# Patient Record
Sex: MALE | Race: WHITE | Employment: OTHER | URBAN - METROPOLITAN AREA
[De-identification: names, ages, dates, MRNs, and addresses within clinical notes are randomized per-mention and may not be internally consistent; named-entity substitution may affect disease eponyms.]

---

## 2017-02-23 PROBLEM — Z87.11 HX OF PEPTIC ULCER: Status: ACTIVE | Noted: 2017-02-23

## 2017-02-23 PROBLEM — K27.9 PEPTIC ULCER DISEASE: Status: RESOLVED | Noted: 2017-02-23 | Resolved: 2017-02-23

## 2017-02-23 PROBLEM — I48.0 PAROXYSMAL ATRIAL FIBRILLATION (HCC): Status: ACTIVE | Noted: 2017-02-23

## 2017-02-23 PROBLEM — N31.9 NEUROGENIC BLADDER: Status: ACTIVE | Noted: 2017-02-23

## 2017-02-23 PROBLEM — Z87.440: Status: ACTIVE | Noted: 2017-02-23

## 2017-02-23 PROBLEM — K27.9 PEPTIC ULCER DISEASE: Status: ACTIVE | Noted: 2017-02-23

## 2017-08-30 PROBLEM — M48.00 SPINAL STENOSIS: Status: ACTIVE | Noted: 2017-08-30

## 2017-10-25 ENCOUNTER — HOSPITAL ENCOUNTER (OUTPATIENT)
Dept: GENERAL RADIOLOGY | Age: 82
Discharge: HOME | End: 2017-10-25
Payer: MEDICARE

## 2017-10-25 DIAGNOSIS — R05.9 COUGH: ICD-10-CM

## 2017-10-25 PROCEDURE — 71020 XR CHEST PA LAT: CPT

## 2017-11-06 PROBLEM — I48.91 ATRIAL FIBRILLATION (HCC): Status: ACTIVE | Noted: 2017-02-23

## 2017-11-06 PROBLEM — I50.30 HEART FAILURE WITH PRESERVED EJECTION FRACTION (HCC): Status: ACTIVE | Noted: 2017-11-06

## 2020-01-24 PROBLEM — R00.1 BRADYCARDIA: Status: ACTIVE | Noted: 2020-01-24

## 2020-01-24 PROBLEM — I48.21 PERMANENT ATRIAL FIBRILLATION (HCC): Status: ACTIVE | Noted: 2017-02-23

## 2020-02-20 ENCOUNTER — HOSPITAL ENCOUNTER (INPATIENT)
Age: 85
LOS: 2 days | Discharge: HOME HEALTH | DRG: 698 | End: 2020-02-22
Attending: EMERGENCY MEDICINE | Admitting: FAMILY MEDICINE
Payer: MEDICARE

## 2020-02-20 ENCOUNTER — APPOINTMENT (OUTPATIENT)
Dept: GENERAL RADIOLOGY | Age: 85
DRG: 698 | End: 2020-02-20
Attending: EMERGENCY MEDICINE
Payer: MEDICARE

## 2020-02-20 DIAGNOSIS — T83.511A URINARY TRACT INFECTION ASSOCIATED WITH CATHETERIZATION OF URINARY TRACT, UNSPECIFIED INDWELLING URINARY CATHETER TYPE, INITIAL ENCOUNTER (HCC): Primary | ICD-10-CM

## 2020-02-20 DIAGNOSIS — I48.11 LONGSTANDING PERSISTENT ATRIAL FIBRILLATION (HCC): ICD-10-CM

## 2020-02-20 DIAGNOSIS — I10 ESSENTIAL HYPERTENSION: ICD-10-CM

## 2020-02-20 DIAGNOSIS — N39.0 URINARY TRACT INFECTION ASSOCIATED WITH CATHETERIZATION OF URINARY TRACT, UNSPECIFIED INDWELLING URINARY CATHETER TYPE, INITIAL ENCOUNTER (HCC): Primary | ICD-10-CM

## 2020-02-20 DIAGNOSIS — I50.33 DIASTOLIC CHF, ACUTE ON CHRONIC (HCC): ICD-10-CM

## 2020-02-20 PROBLEM — Z87.11 HX OF PEPTIC ULCER: Status: RESOLVED | Noted: 2017-02-23 | Resolved: 2020-02-20

## 2020-02-20 PROBLEM — I50.30 HEART FAILURE WITH PRESERVED EJECTION FRACTION (HCC): Chronic | Status: ACTIVE | Noted: 2017-11-06

## 2020-02-20 PROBLEM — A41.9 SEPSIS SECONDARY TO UTI (HCC): Status: ACTIVE | Noted: 2020-02-20

## 2020-02-20 PROBLEM — M48.00 SPINAL STENOSIS: Status: RESOLVED | Noted: 2017-08-30 | Resolved: 2020-02-20

## 2020-02-20 PROBLEM — Z87.440: Status: RESOLVED | Noted: 2017-02-23 | Resolved: 2020-02-20

## 2020-02-20 PROBLEM — N31.9 NEUROGENIC BLADDER: Chronic | Status: ACTIVE | Noted: 2017-02-23

## 2020-02-20 PROBLEM — I48.21 PERMANENT ATRIAL FIBRILLATION (HCC): Status: RESOLVED | Noted: 2017-02-23 | Resolved: 2020-02-20

## 2020-02-20 PROBLEM — R00.1 BRADYCARDIA: Status: RESOLVED | Noted: 2020-01-24 | Resolved: 2020-02-20

## 2020-02-20 LAB
ALBUMIN SERPL-MCNC: 3.3 G/DL (ref 3.2–4.6)
ALBUMIN/GLOB SERPL: 1 {RATIO} (ref 1.2–3.5)
ALP SERPL-CCNC: 80 U/L (ref 50–136)
ALT SERPL-CCNC: 11 U/L (ref 12–65)
ANION GAP SERPL CALC-SCNC: 5 MMOL/L (ref 7–16)
AST SERPL-CCNC: 13 U/L (ref 15–37)
BACTERIA URNS QL MICRO: ABNORMAL /HPF
BASOPHILS # BLD: 0 K/UL (ref 0–0.2)
BASOPHILS NFR BLD: 0 % (ref 0–2)
BILIRUB SERPL-MCNC: 0.6 MG/DL (ref 0.2–1.1)
BUN SERPL-MCNC: 19 MG/DL (ref 8–23)
CALCIUM SERPL-MCNC: 8.5 MG/DL (ref 8.3–10.4)
CASTS URNS QL MICRO: ABNORMAL /LPF
CHLORIDE SERPL-SCNC: 107 MMOL/L (ref 98–107)
CO2 SERPL-SCNC: 28 MMOL/L (ref 21–32)
CREAT SERPL-MCNC: 2 MG/DL (ref 0.8–1.5)
DIFFERENTIAL METHOD BLD: ABNORMAL
EOSINOPHIL # BLD: 0.1 K/UL (ref 0–0.8)
EOSINOPHIL NFR BLD: 1 % (ref 0.5–7.8)
EPI CELLS #/AREA URNS HPF: 0 /HPF
ERYTHROCYTE [DISTWIDTH] IN BLOOD BY AUTOMATED COUNT: 15.9 % (ref 11.9–14.6)
FLUAV AG NPH QL IA: NEGATIVE
FLUBV AG NPH QL IA: NEGATIVE
GLOBULIN SER CALC-MCNC: 3.2 G/DL (ref 2.3–3.5)
GLUCOSE SERPL-MCNC: 111 MG/DL (ref 65–100)
HCT VFR BLD AUTO: 31.7 % (ref 41.1–50.3)
HGB BLD-MCNC: 10.6 G/DL (ref 13.6–17.2)
IMM GRANULOCYTES # BLD AUTO: 0.1 K/UL (ref 0–0.5)
IMM GRANULOCYTES NFR BLD AUTO: 1 % (ref 0–5)
LACTATE SERPL-SCNC: 0.9 MMOL/L (ref 0.4–2)
LYMPHOCYTES # BLD: 0.5 K/UL (ref 0.5–4.6)
LYMPHOCYTES NFR BLD: 6 % (ref 13–44)
MCH RBC QN AUTO: 31.2 PG (ref 26.1–32.9)
MCHC RBC AUTO-ENTMCNC: 33.4 G/DL (ref 31.4–35)
MCV RBC AUTO: 93.2 FL (ref 79.6–97.8)
MONOCYTES # BLD: 0.6 K/UL (ref 0.1–1.3)
MONOCYTES NFR BLD: 7 % (ref 4–12)
NEUTS SEG # BLD: 7.5 K/UL (ref 1.7–8.2)
NEUTS SEG NFR BLD: 85 % (ref 43–78)
NRBC # BLD: 0 K/UL (ref 0–0.2)
PLATELET # BLD AUTO: 147 K/UL (ref 150–450)
PMV BLD AUTO: 11.8 FL (ref 9.4–12.3)
POTASSIUM SERPL-SCNC: 3.6 MMOL/L (ref 3.5–5.1)
PROCALCITONIN SERPL-MCNC: 0.05 NG/ML
PROT SERPL-MCNC: 6.5 G/DL (ref 6.3–8.2)
RBC # BLD AUTO: 3.4 M/UL (ref 4.23–5.6)
RBC #/AREA URNS HPF: ABNORMAL /HPF
SODIUM SERPL-SCNC: 140 MMOL/L (ref 136–145)
SPECIMEN SOURCE: NORMAL
WBC # BLD AUTO: 8.9 K/UL (ref 4.3–11.1)
WBC URNS QL MICRO: >100 /HPF

## 2020-02-20 PROCEDURE — 74011250637 HC RX REV CODE- 250/637: Performed by: EMERGENCY MEDICINE

## 2020-02-20 PROCEDURE — 80053 COMPREHEN METABOLIC PANEL: CPT

## 2020-02-20 PROCEDURE — 84145 PROCALCITONIN (PCT): CPT

## 2020-02-20 PROCEDURE — 74011000258 HC RX REV CODE- 258: Performed by: FAMILY MEDICINE

## 2020-02-20 PROCEDURE — 99285 EMERGENCY DEPT VISIT HI MDM: CPT

## 2020-02-20 PROCEDURE — 74011250637 HC RX REV CODE- 250/637: Performed by: FAMILY MEDICINE

## 2020-02-20 PROCEDURE — 87186 SC STD MICRODIL/AGAR DIL: CPT

## 2020-02-20 PROCEDURE — 71046 X-RAY EXAM CHEST 2 VIEWS: CPT

## 2020-02-20 PROCEDURE — 87088 URINE BACTERIA CULTURE: CPT

## 2020-02-20 PROCEDURE — 81003 URINALYSIS AUTO W/O SCOPE: CPT

## 2020-02-20 PROCEDURE — 74011250636 HC RX REV CODE- 250/636: Performed by: EMERGENCY MEDICINE

## 2020-02-20 PROCEDURE — 74011250636 HC RX REV CODE- 250/636: Performed by: FAMILY MEDICINE

## 2020-02-20 PROCEDURE — 87804 INFLUENZA ASSAY W/OPTIC: CPT

## 2020-02-20 PROCEDURE — 87040 BLOOD CULTURE FOR BACTERIA: CPT

## 2020-02-20 PROCEDURE — 93005 ELECTROCARDIOGRAM TRACING: CPT | Performed by: EMERGENCY MEDICINE

## 2020-02-20 PROCEDURE — 87086 URINE CULTURE/COLONY COUNT: CPT

## 2020-02-20 PROCEDURE — 85025 COMPLETE CBC W/AUTO DIFF WBC: CPT

## 2020-02-20 PROCEDURE — 65270000029 HC RM PRIVATE

## 2020-02-20 PROCEDURE — 83605 ASSAY OF LACTIC ACID: CPT

## 2020-02-20 PROCEDURE — 81015 MICROSCOPIC EXAM OF URINE: CPT

## 2020-02-20 RX ORDER — LEVOTHYROXINE SODIUM 100 UG/1
100 TABLET ORAL
Status: DISCONTINUED | OUTPATIENT
Start: 2020-02-21 | End: 2020-02-22 | Stop reason: HOSPADM

## 2020-02-20 RX ORDER — BISACODYL 5 MG
5 TABLET, DELAYED RELEASE (ENTERIC COATED) ORAL DAILY PRN
Status: DISCONTINUED | OUTPATIENT
Start: 2020-02-20 | End: 2020-02-22 | Stop reason: HOSPADM

## 2020-02-20 RX ORDER — SODIUM CHLORIDE 9 MG/ML
125 INJECTION, SOLUTION INTRAVENOUS CONTINUOUS
Status: DISCONTINUED | OUTPATIENT
Start: 2020-02-20 | End: 2020-02-22 | Stop reason: HOSPADM

## 2020-02-20 RX ORDER — PRAVASTATIN SODIUM 20 MG/1
40 TABLET ORAL
Status: DISCONTINUED | OUTPATIENT
Start: 2020-02-20 | End: 2020-02-22 | Stop reason: HOSPADM

## 2020-02-20 RX ORDER — NALOXONE HYDROCHLORIDE 0.4 MG/ML
0.4 INJECTION, SOLUTION INTRAMUSCULAR; INTRAVENOUS; SUBCUTANEOUS AS NEEDED
Status: DISCONTINUED | OUTPATIENT
Start: 2020-02-20 | End: 2020-02-22 | Stop reason: HOSPADM

## 2020-02-20 RX ORDER — OXYCODONE HYDROCHLORIDE 5 MG/1
5 TABLET ORAL
Status: DISCONTINUED | OUTPATIENT
Start: 2020-02-20 | End: 2020-02-22 | Stop reason: HOSPADM

## 2020-02-20 RX ORDER — GABAPENTIN 300 MG/1
300 CAPSULE ORAL 3 TIMES DAILY
Status: DISCONTINUED | OUTPATIENT
Start: 2020-02-20 | End: 2020-02-22 | Stop reason: HOSPADM

## 2020-02-20 RX ORDER — METOPROLOL SUCCINATE 50 MG/1
50 TABLET, EXTENDED RELEASE ORAL DAILY
Status: DISCONTINUED | OUTPATIENT
Start: 2020-02-21 | End: 2020-02-22 | Stop reason: HOSPADM

## 2020-02-20 RX ORDER — TRAZODONE HYDROCHLORIDE 50 MG/1
200 TABLET ORAL
Status: DISCONTINUED | OUTPATIENT
Start: 2020-02-20 | End: 2020-02-22 | Stop reason: HOSPADM

## 2020-02-20 RX ORDER — FUROSEMIDE 40 MG/1
40 TABLET ORAL
Status: DISCONTINUED | OUTPATIENT
Start: 2020-02-20 | End: 2020-02-22 | Stop reason: HOSPADM

## 2020-02-20 RX ORDER — LORAZEPAM 0.5 MG/1
0.5 TABLET ORAL
Status: DISCONTINUED | OUTPATIENT
Start: 2020-02-20 | End: 2020-02-22 | Stop reason: HOSPADM

## 2020-02-20 RX ORDER — SODIUM CHLORIDE 0.9 % (FLUSH) 0.9 %
5-40 SYRINGE (ML) INJECTION AS NEEDED
Status: DISCONTINUED | OUTPATIENT
Start: 2020-02-20 | End: 2020-02-22 | Stop reason: HOSPADM

## 2020-02-20 RX ORDER — AMIODARONE HYDROCHLORIDE 200 MG/1
200 TABLET ORAL DAILY
Status: DISCONTINUED | OUTPATIENT
Start: 2020-02-21 | End: 2020-02-22 | Stop reason: HOSPADM

## 2020-02-20 RX ORDER — ACETAMINOPHEN 325 MG/1
650 TABLET ORAL
Status: DISCONTINUED | OUTPATIENT
Start: 2020-02-20 | End: 2020-02-22 | Stop reason: HOSPADM

## 2020-02-20 RX ORDER — SODIUM CHLORIDE 0.9 % (FLUSH) 0.9 %
5-40 SYRINGE (ML) INJECTION EVERY 8 HOURS
Status: DISCONTINUED | OUTPATIENT
Start: 2020-02-20 | End: 2020-02-22 | Stop reason: HOSPADM

## 2020-02-20 RX ORDER — ACETAMINOPHEN 500 MG
1000 TABLET ORAL
Status: COMPLETED | OUTPATIENT
Start: 2020-02-20 | End: 2020-02-20

## 2020-02-20 RX ORDER — PANTOPRAZOLE SODIUM 40 MG/1
40 TABLET, DELAYED RELEASE ORAL DAILY
Status: DISCONTINUED | OUTPATIENT
Start: 2020-02-21 | End: 2020-02-22 | Stop reason: HOSPADM

## 2020-02-20 RX ADMIN — PRAVASTATIN SODIUM 40 MG: 20 TABLET ORAL at 22:28

## 2020-02-20 RX ADMIN — TRAZODONE HYDROCHLORIDE 200 MG: 50 TABLET ORAL at 22:28

## 2020-02-20 RX ADMIN — GABAPENTIN 300 MG: 300 CAPSULE ORAL at 22:28

## 2020-02-20 RX ADMIN — CEFEPIME HYDROCHLORIDE 1 G: 1 INJECTION, POWDER, FOR SOLUTION INTRAMUSCULAR; INTRAVENOUS at 22:29

## 2020-02-20 RX ADMIN — APIXABAN 2.5 MG: 2.5 TABLET, FILM COATED ORAL at 22:35

## 2020-02-20 RX ADMIN — SODIUM CHLORIDE 1000 ML: 900 INJECTION, SOLUTION INTRAVENOUS at 17:10

## 2020-02-20 RX ADMIN — ACETAMINOPHEN 1000 MG: 500 TABLET, FILM COATED ORAL at 18:49

## 2020-02-20 RX ADMIN — SODIUM CHLORIDE 125 ML/HR: 900 INJECTION, SOLUTION INTRAVENOUS at 22:44

## 2020-02-20 RX ADMIN — Medication 10 ML: at 21:34

## 2020-02-20 NOTE — ED TRIAGE NOTES
Pt BIB EMS for fever 103, reports cough. States onset of fever this morning. Around sick contacts at [facility] [employer]. Given 4.45gm Zosyn. HR , afib.

## 2020-02-20 NOTE — ED PROVIDER NOTES
44-year-old gentleman presents with concerns about a fever and worries about a possible UTI. He does self-catheterization because he said otherwise he leaks a lot. He said in general that he just was not feeling well and so he was brought in for further evaluation. Family reports that he does have a history of previous resistant bacteria. Patient does note that he has been around individuals who were sick at [facility]. Elements of this note were created using speech recognition software. As such, errors of speech recognition may be present.            Past Medical History:   Diagnosis Date    Arthritis     Back pain     CAD (coronary artery disease)     PCIs    Cardiac pacemaker 5/9/2016    Chronic kidney disease     Chronic pain     CKD (chronic kidney disease), stage III ([address] 5/9/2016    Coronary atherosclerosis of native coronary vessel 5/9/2016    Disorder of bladder     Dysuria 12/19/2013    Edema     GERD (gastroesophageal reflux disease)     Heart disease, unspecified 12/19/2013    Heart failure ([address]     Ill-defined condition     HLD    Malignant neoplasm of [address] ([facility] [address] 12/19/2013    Palpitations     Peripheral neuropathy 5/9/2016    Thyroid disease     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    Renal cyst    Unspecified essential hypertension 12/19/2013    Unspecified urinary incontinence 12/19/2013    Urgency of urination 12/19/2013    Urinary tract infection, site not specified        Past Surgical History:   Procedure Laterality Date    CARDIAC SURG PROCEDURE UNLIST      pacemaker    HX APPENDECTOMY      HX PACEMAKER      HX TONSILLECTOMY           Family History:   Problem Relation Age of Onset    Hypertension Other     Diabetes Other     Cancer Other     Heart Disease Other     Asthma Other        Social History     Socioeconomic History    Marital status:      Spouse name: Not on file    Number of children: Not on file    Years of education: Not on file    Highest education level: Not on file   Occupational History    Not on file   Social Needs    Financial resource strain: Not on file    Food insecurity:     Worry: Not on file     Inability: Not on file    Transportation needs:     Medical: Not on file     Non-medical: Not on file   Tobacco Use    Smoking status: Never Smoker    Smokeless tobacco: Never Used   Substance and Sexual Activity    Alcohol use: No    Drug use: Not on file    Sexual activity: Not on file   Lifestyle    Physical activity:     Days per week: Not on file     Minutes per session: Not on file    Stress: Not on file   Relationships    Social connections:     Talks on phone: Not on file     Gets together: Not on file     Attends Anabaptism service: Not on file     Active member of club or organization: Not on file     Attends meetings of clubs or organizations: Not on file     Relationship status: Not on file    Intimate partner violence:     Fear of current or ex partner: Not on file     Emotionally abused: Not on file     Physically abused: Not on file     Forced sexual activity: Not on file   Other Topics Concern    Not on file   Social History Narrative    Not on file         ALLERGIES: Anaprox [naproxen sodium]; Cataflam [diclofenac potassium]; Feldene [piroxicam]; Flexeril [cyclobenzaprine]; Keflex [cephalexin]; Lotensin [benazepril]; Relafen [nabumetone]; and Sulfa (sulfonamide antibiotics)    Review of Systems   Constitutional: Positive for activity change, chills and fever. Negative for diaphoresis. HENT: Negative for congestion, rhinorrhea and sore throat. Eyes: Negative for redness and visual disturbance. Respiratory: Negative for cough, chest tightness, shortness of breath and wheezing. Cardiovascular: Negative for chest pain and palpitations. Gastrointestinal: Negative for abdominal pain, blood in stool, diarrhea, nausea and vomiting.    Endocrine: Negative for polydipsia and polyuria. Genitourinary: Negative for dysuria and hematuria. Musculoskeletal: Negative for arthralgias, myalgias and neck stiffness. Skin: Negative for rash. Allergic/Immunologic: Negative for environmental allergies and food allergies. Neurological: Negative for dizziness, weakness and headaches. Hematological: Negative for adenopathy. Does not bruise/bleed easily. Psychiatric/Behavioral: Negative for confusion and sleep disturbance. The patient is not nervous/anxious. Vitals:    02/20/20 1708 02/20/20 1720 02/20/20 1732 02/20/20 1747   BP:  108/60 117/56 130/66   Pulse:  84 86 83   Resp:  30 19 20   Temp: (!) 101.5 °F (38.6 °C)      SpO2:  99% 100% 100%   Weight:       Height:                Physical Exam  Vitals signs and nursing note reviewed. Constitutional:       General: He is not in acute distress. Appearance: He is well-developed. He is not toxic-appearing. HENT:      Head: Normocephalic and atraumatic. Eyes:      General: No scleral icterus. Right eye: No discharge. Left eye: No discharge. Conjunctiva/sclera: Conjunctivae normal.      Pupils: Pupils are equal, round, and reactive to light. Neck:      Musculoskeletal: Normal range of motion. No neck rigidity. Cardiovascular:      Rate and Rhythm: Normal rate. Rhythm irregular. Heart sounds: Normal heart sounds. Pulmonary:      Effort: Pulmonary effort is normal. No respiratory distress. Breath sounds: Normal breath sounds. No wheezing or rales. Chest:      Chest wall: No tenderness. Abdominal:      General: Bowel sounds are normal. There is no distension. Palpations: Abdomen is soft. Tenderness: There is no guarding or rebound. Musculoskeletal: Normal range of motion. General: No tenderness. Lymphadenopathy:      Cervical: No cervical adenopathy. Skin:     General: Skin is warm and dry. Neurological:      General: No focal deficit present.       Mental Status: He is alert and oriented to person, place, and time. Psychiatric:         Mood and Affect: Mood normal.         Behavior: Behavior normal.          MDM  Number of Diagnoses or Management Options  Diagnosis management comments: Patient's urine is positive. [facility] of [facility] indicate a recent resistant Morganella infection. I think he would benefit from admission for IV antibiotics pending culture results. 6:36 PM  I spoke with the hospitalist who kindly agreed to see the patient.          Procedures

## 2020-02-21 PROBLEM — Z78.9 SELF-CATHETERIZES URINARY BLADDER: Chronic | Status: ACTIVE | Noted: 2020-02-21

## 2020-02-21 LAB
ALBUMIN SERPL-MCNC: 3.2 G/DL (ref 3.2–4.6)
ALBUMIN/GLOB SERPL: 1 {RATIO} (ref 1.2–3.5)
ALP SERPL-CCNC: 76 U/L (ref 50–136)
ALT SERPL-CCNC: 11 U/L (ref 12–65)
ANION GAP SERPL CALC-SCNC: 7 MMOL/L (ref 7–16)
AST SERPL-CCNC: 11 U/L (ref 15–37)
ATRIAL RATE: 250 BPM
BASOPHILS # BLD: 0 K/UL (ref 0–0.2)
BASOPHILS NFR BLD: 0 % (ref 0–2)
BILIRUB SERPL-MCNC: 0.6 MG/DL (ref 0.2–1.1)
BUN SERPL-MCNC: 20 MG/DL (ref 8–23)
CALCIUM SERPL-MCNC: 8.3 MG/DL (ref 8.3–10.4)
CALCULATED R AXIS, ECG10: 76 DEGREES
CALCULATED T AXIS, ECG11: -22 DEGREES
CHLORIDE SERPL-SCNC: 110 MMOL/L (ref 98–107)
CO2 SERPL-SCNC: 26 MMOL/L (ref 21–32)
CREAT SERPL-MCNC: 1.95 MG/DL (ref 0.8–1.5)
DIAGNOSIS, 93000: NORMAL
DIFFERENTIAL METHOD BLD: ABNORMAL
EOSINOPHIL # BLD: 0.1 K/UL (ref 0–0.8)
EOSINOPHIL NFR BLD: 1 % (ref 0.5–7.8)
ERYTHROCYTE [DISTWIDTH] IN BLOOD BY AUTOMATED COUNT: 15.9 % (ref 11.9–14.6)
GLOBULIN SER CALC-MCNC: 3.2 G/DL (ref 2.3–3.5)
GLUCOSE SERPL-MCNC: 101 MG/DL (ref 65–100)
HCT VFR BLD AUTO: 32.3 % (ref 41.1–50.3)
HGB BLD-MCNC: 10.6 G/DL (ref 13.6–17.2)
IMM GRANULOCYTES # BLD AUTO: 0.1 K/UL (ref 0–0.5)
IMM GRANULOCYTES NFR BLD AUTO: 2 % (ref 0–5)
LYMPHOCYTES # BLD: 0.9 K/UL (ref 0.5–4.6)
LYMPHOCYTES NFR BLD: 11 % (ref 13–44)
MCH RBC QN AUTO: 31 PG (ref 26.1–32.9)
MCHC RBC AUTO-ENTMCNC: 32.8 G/DL (ref 31.4–35)
MCV RBC AUTO: 94.4 FL (ref 79.6–97.8)
MONOCYTES # BLD: 0.8 K/UL (ref 0.1–1.3)
MONOCYTES NFR BLD: 11 % (ref 4–12)
NEUTS SEG # BLD: 5.7 K/UL (ref 1.7–8.2)
NEUTS SEG NFR BLD: 75 % (ref 43–78)
NRBC # BLD: 0 K/UL (ref 0–0.2)
PLATELET # BLD AUTO: 131 K/UL (ref 150–450)
PMV BLD AUTO: 12.2 FL (ref 9.4–12.3)
POTASSIUM SERPL-SCNC: 3.6 MMOL/L (ref 3.5–5.1)
PROT SERPL-MCNC: 6.4 G/DL (ref 6.3–8.2)
Q-T INTERVAL, ECG07: 370 MS
QRS DURATION, ECG06: 98 MS
QTC CALCULATION (BEZET), ECG08: 452 MS
RBC # BLD AUTO: 3.42 M/UL (ref 4.23–5.6)
SODIUM SERPL-SCNC: 143 MMOL/L (ref 136–145)
VENTRICULAR RATE, ECG03: 90 BPM
WBC # BLD AUTO: 7.6 K/UL (ref 4.3–11.1)

## 2020-02-21 PROCEDURE — 74011250637 HC RX REV CODE- 250/637: Performed by: FAMILY MEDICINE

## 2020-02-21 PROCEDURE — 80053 COMPREHEN METABOLIC PANEL: CPT

## 2020-02-21 PROCEDURE — 65270000029 HC RM PRIVATE

## 2020-02-21 PROCEDURE — 77030019905 HC CATH URETH INTMIT MDII -A

## 2020-02-21 PROCEDURE — 74011000258 HC RX REV CODE- 258: Performed by: FAMILY MEDICINE

## 2020-02-21 PROCEDURE — 36415 COLL VENOUS BLD VENIPUNCTURE: CPT

## 2020-02-21 PROCEDURE — 85025 COMPLETE CBC W/AUTO DIFF WBC: CPT

## 2020-02-21 PROCEDURE — 74011250636 HC RX REV CODE- 250/636: Performed by: FAMILY MEDICINE

## 2020-02-21 RX ADMIN — CEFEPIME HYDROCHLORIDE 1 G: 1 INJECTION, POWDER, FOR SOLUTION INTRAMUSCULAR; INTRAVENOUS at 21:09

## 2020-02-21 RX ADMIN — Medication 20 ML: at 14:00

## 2020-02-21 RX ADMIN — AMIODARONE HYDROCHLORIDE 200 MG: 200 TABLET ORAL at 08:44

## 2020-02-21 RX ADMIN — HYDROCHLOROTHIAZIDE: 12.5 CAPSULE ORAL at 08:43

## 2020-02-21 RX ADMIN — PRAVASTATIN SODIUM 40 MG: 20 TABLET ORAL at 21:07

## 2020-02-21 RX ADMIN — METOPROLOL SUCCINATE 50 MG: 50 TABLET, EXTENDED RELEASE ORAL at 08:43

## 2020-02-21 RX ADMIN — TRAZODONE HYDROCHLORIDE 200 MG: 50 TABLET ORAL at 21:07

## 2020-02-21 RX ADMIN — LEVOTHYROXINE SODIUM 100 MCG: 100 TABLET ORAL at 05:21

## 2020-02-21 RX ADMIN — GABAPENTIN 300 MG: 300 CAPSULE ORAL at 16:52

## 2020-02-21 RX ADMIN — SODIUM CHLORIDE 125 ML/HR: 900 INJECTION, SOLUTION INTRAVENOUS at 06:23

## 2020-02-21 RX ADMIN — APIXABAN 2.5 MG: 2.5 TABLET, FILM COATED ORAL at 08:44

## 2020-02-21 RX ADMIN — PANTOPRAZOLE SODIUM 40 MG: 40 TABLET, DELAYED RELEASE ORAL at 08:43

## 2020-02-21 RX ADMIN — SODIUM CHLORIDE 125 ML/HR: 900 INJECTION, SOLUTION INTRAVENOUS at 21:15

## 2020-02-21 RX ADMIN — GABAPENTIN 300 MG: 300 CAPSULE ORAL at 08:44

## 2020-02-21 RX ADMIN — SODIUM CHLORIDE 125 ML/HR: 900 INJECTION, SOLUTION INTRAVENOUS at 13:05

## 2020-02-21 RX ADMIN — GABAPENTIN 300 MG: 300 CAPSULE ORAL at 21:07

## 2020-02-21 RX ADMIN — APIXABAN 2.5 MG: 2.5 TABLET, FILM COATED ORAL at 16:52

## 2020-02-21 RX ADMIN — Medication 10 ML: at 21:10

## 2020-02-21 NOTE — CDMP QUERY
Patient admitted with sepsis due to UTI. Patient noted to perform self-catheterization for neurogenic bladder. If possible, please document in the progress notes and d/c summary if you are evaluating and / or treating any of the following: 
 
? UTI due to chronic self-catheterization ? UTI not due to self-catheterization ? Other ? Clinically unable to determine The medical record reflects the following: 
   Risk Factors: UTI, self-catheterization for neurogenic bladder Clinical Indicators: UA with 4+bacteria, self-catheterization for many years, Per MD \"suspect he is not using good sterile technique\", frequent UTI's 
   Treatment: Sade Richter, RN, BSN, CDS Clinical Documentation Improvement 
[phone]

## 2020-02-21 NOTE — PROGRESS NOTES
Care Management Interventions  PCP Verified by CM: Yes  Transition of Care Consult (CM Consult): Home Health  Physical Therapy Consult: No  Occupational Therapy Consult: No  Current Support Network: Lives Alone  Confirm Follow Up Transport: Friends  Freedom of Choice List was Provided with Basic Dialogue that Supports the Patient's Individualized Plan of Care/Goals, Treatment Preferences and Shares the Quality Data Associated with the Providers?: Yes   Resource Information Provided?: No  Discharge Location  Discharge Placement: Home with home health  Patient is alert and oriented in all spheres. Lives alone. Uses a walker and cane to ambulate. Patient's friend takes patient to appointments. Daughter lives near but is not a support system to patient. Patient has a history of HH but no rehab history. CM following for discharge needs.

## 2020-02-21 NOTE — ED NOTES
TRANSFER - OUT REPORT:    Verbal report given to ALEXANDRIA HERNÁNDEZ RN(name) on Ashlyn Castellanos  being transferred to [facility](unit) for routine progression of care       Report consisted of patients Situation, Background, Assessment and   Recommendations(SBAR). Information from the following report(s) SBAR, Kardex, ED Summary, [facility] and Recent Results was reviewed with the receiving nurse. Lines:   Peripheral IV 02/20/20 Right Antecubital (Active)   Site Assessment Clean, dry, & intact 2/20/2020  8:17 PM   Phlebitis Assessment 0 2/20/2020  8:17 PM   Infiltration Assessment 0 2/20/2020  8:17 PM   Dressing Status Clean, dry, & intact 2/20/2020  8:17 PM   Hub Color/Line Status Green 2/20/2020  8:17 PM       Peripheral IV 02/20/20 Left Forearm (Active)   Site Assessment Clean, dry, & intact 2/20/2020  8:18 PM   Phlebitis Assessment 0 2/20/2020  8:18 PM   Infiltration Assessment 0 2/20/2020  8:18 PM   Dressing Status Clean, dry, & intact 2/20/2020  8:18 PM   Hub Color/Line Status Green 2/20/2020  8:18 PM        Opportunity for questions and clarification was provided.       Patient transported with:   [employer]

## 2020-02-21 NOTE — PROGRESS NOTES
HOSPITALIST H&P  NAME:  Raya Oneill   Age:  80 y.o.  :   3/23/1932   MRN:   [id number]  PCP: Giovana Pearl NP  Treatment Team: Attending Provider: Sean Linares MD; Primary Nurse: Jameson Napier, OG    No Order     CC: Reason for admission is: Urosepsis    *ATTENTION:  This note has been created by a medical student for educational purposes only. Please do not refer to the content of this note for clinical decision-making, billing, or other purposes. Please see attending physicians note to obtain clinical information on this patient. *    HPI:   Patient history was obtained from the ER provider prior to seeing the patient. Evangelist Zazueta is a 81yo M with PMH of Prostate Ca, urinary inctoninence, CKD grade 3, HTN, CHF, Afib being admitted for urosepsis. Pt has had to self-catheterize himself for the past 10yrs due to \"prostate\" problems. Pt was seen by FP on  for UTI which he was Rx Ciprofloxacin. Pt was seen by Urologist on  for urinary retention and UTI, which urine cultures grew Morganella Morgani resistant to Ciprofloxacin. Pt comes to the ER today for fevers and night sweats that have lasted for the past week. Pt does not complain of any pain at urethral meatus or burning with catheterization. Pt states that this is not a new problem and has had chronic urinary retention and UTIs. Pt does not c/o HA, CP, abdominal pain, n/v, or diarrhea. Pt does c/o weakness associated with fevers, and constipation which is a continuous problem. ED course 2020: Pt arrived with a 101. 5degF fever which he was administered Acetaminophen 1000mg PO. Labs showed Hgb of 10.6, normal WBC at 8.9 with rest of labs WNL. UA grew 4+ bacteria and >100WBCs with urine culture pending. CXR showed no consolidation or acute changes. EKG showed Afib with possible past anterior infarct. ROS:  All systems have been reviewed and are negative except as stated in HPI or elsewhere.       Past Medical History:   Diagnosis Date    Arthritis     Back pain     CAD (coronary artery disease)     PCIs    Cardiac pacemaker 5/9/2016    Chronic kidney disease     Chronic pain     CKD (chronic kidney disease), stage III ([facility] [address] 5/9/2016    Coronary atherosclerosis of native coronary vessel 5/9/2016    Disorder of bladder     Dysuria 12/19/2013    Edema     GERD (gastroesophageal reflux disease)     Heart disease, unspecified 12/19/2013    Heart failure ([facility] [address]     Ill-defined condition     HLD    Malignant neoplasm of prostate ([facility] [address] 12/19/2013    Palpitations     Peripheral neuropathy 5/9/2016    Thyroid disease     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    Renal cyst    Unspecified essential hypertension 12/19/2013    Unspecified urinary incontinence 12/19/2013    Urgency of urination 12/19/2013    Urinary tract infection, site not specified       Past Surgical History:   Procedure Laterality Date    CARDIAC SURG PROCEDURE UNLIST      pacemaker    HX APPENDECTOMY      HX PACEMAKER      HX TONSILLECTOMY        Social History     Tobacco Use    Smoking status: Never Smoker    Smokeless tobacco: Never Used   Substance Use Topics    Alcohol use: No      Family History   Problem Relation Age of Onset    Hypertension Other     Diabetes Other     Cancer Other     Heart Disease Other     Asthma Other        FH Reviewed and non-contributory to admitting diagnosis    Allergies   Allergen Reactions    Anaprox [Naproxen Sodium] Unknown (comments)    Cataflam [Diclofenac Potassium] Unknown (comments)    Feldene [Piroxicam] Unknown (comments)    Flexeril [Cyclobenzaprine] Unknown (comments)    Keflex [Cephalexin] Unknown (comments)     Can tolerate per patient    Lotensin [Benazepril] Unknown (comments)    Relafen [Nabumetone] Unknown (comments)    Sulfa (Sulfonamide Antibiotics) Unknown (comments)     Can tolerate per patient      Prior to Admission Medications   Prescriptions Last Dose Informant Patient Reported? Taking? CRANBERRY FRUIT CONCENTRATE (AZO CRANBERRY PO)   Yes No   Sig: Take  by mouth. FOLIC ACID/MULTIVIT-MIN/LUTEIN (CENTRUM SILVER PO)   Yes No   Sig: Take  by mouth. acetaminophen (TYLENOL ARTHRITIS PAIN) 650 mg TbER   Yes No   Sig: Take 650 mg by mouth two (2) times a day. amiodarone (CORDARONE) 200 mg tablet   No No   Sig: Take 1 Tab by mouth daily. apixaban (ELIQUIS) 2.5 mg tablet   No No   Sig: Take 1 Tab by mouth two (2) times a day. ferrous sulfate (IRON) 325 mg (65 mg iron) tablet   Yes No   Sig: Take  by mouth Daily (before breakfast). furosemide (LASIX) 40 mg tablet Not Taking at Unknown time  No No   Sig: Take 1 Tab by mouth daily. Indications: Peripheral Edema due to Chronic Heart Failure   Patient taking differently: Take 40 mg by mouth daily as needed. Indications: Peripheral Edema due to Chronic Heart Failure   gabapentin (NEURONTIN) 300 mg capsule   Yes No   Sig: Take 300 mg by mouth three (3) times daily. levothyroxine (SYNTHROID) 100 mcg tablet   No No   Sig: Take 1 Tab by mouth Daily (before breakfast). losartan-hydroCHLOROthiazide (HYZAAR) 100-12.5 mg per tablet   No No   Sig: Take 1 Tab by mouth daily. metoprolol succinate (TOPROL-XL) 50 mg XL tablet   No No   Sig: Take 1 Tab by mouth daily. pantoprazole (PROTONIX) 40 mg tablet   Yes No   Sig: Take 40 mg by mouth daily. potassium chloride (KLOR-CON) 10 mEq tablet Not Taking at Unknown time  No No   Sig: Take 1 Tab by mouth daily. Patient taking differently: Take 10 mEq by mouth daily as needed. pravastatin (PRAVACHOL) 40 mg tablet   Yes No   Sig: Take 40 mg by mouth nightly. psyllium husk (METAMUCIL) 0.4 gram cap   Yes No   Sig: Take  by mouth. traZODone (DESYREL) 50 mg tablet   Yes No   Sig: Take 200 mg by mouth nightly.       Facility-Administered Medications: None         Objective:     No intake or output data in the 24 hours ending 02/20/20 1940   Temp (24hrs), Av.9 °F (38.3 °C), Min:100.3 °F (37.9 °C), Max:101.5 °F (38.6 °C)    Oxygen Therapy  O2 Sat (%): 92 % (20)  Pulse via Oximetry: 94 beats per minute (20)   Body mass index is 23.63 kg/m². Patient Vitals for the past 24 hrs:   Temp Pulse Resp BP SpO2   20 193  92 14 128/79    20  (!) 101 18 156/80 92 %   20 190  85 25  100 %   20  (!) 112 26 168/71 (!) 81 %   20 1832  97 20 133/63 96 %   20 1818  94 26 150/75 100 %   20 1747  83 20 130/66 100 %   20 1732  86 19 117/56 100 %   20 1720  84 30 108/60 99 %   20 1708 (!) 101.5 °F (38.6 °C)       20 1700  84 (!) 38 (!) 86/53 98 %   20 1636 100.3 °F (37.9 °C) 89 20 95/54 99 %     Physical Exam:    General:    Alert, no acute distress, WDWN   Head:   Normocephalic, without obvious abnormality, atraumatic. ENT:  Hearing is diminished. Resp:    Clear to auscultation bilaterally. No Wheezing or Rhonchi. Resp are even and unlabored  Heart[de-identified]  Irregularly irregular rhythm with regula rate,  no murmur,   No LE edema  Abdomen:   Soft, non-tender. Not distended. Bowel sounds normal.   Skin:     Texture, turgor normal. No significant rashes or lesions. Capillary refill < 2 sec  Psych: Alert and oriented x 4;  Judgement and insight are normal     Data Review:   Recent Results (from the past 24 hour(s))   EKG, 12 LEAD, INITIAL    Collection Time: 20  4:45 PM   Result Value Ref Range    Ventricular Rate 90 BPM    Atrial Rate 250 BPM    QRS Duration 98 ms    Q-T Interval 370 ms    QTC Calculation (Bezet) 452 ms    Calculated R Axis 76 degrees    Calculated T Axis -22 degrees    Diagnosis       !! AGE AND GENDER SPECIFIC ECG ANALYSIS !!   Atrial fibrillation  Possible Anterior infarct , age undetermined  ST & T wave abnormality, consider lateral ischemia or digitalis effect  Abnormal ECG  When compared with ECG of 06-MAY-2015 11:27,  Atrial fibrillation has replaced Electronic ventricular pacemaker     CBC WITH AUTOMATED DIFF    Collection Time: 02/20/20  4:48 PM   Result Value Ref Range    WBC 8.9 4.3 - 11.1 K/uL    RBC 3.40 (L) 4.23 - 5.6 M/uL    HGB 10.6 (L) 13.6 - 17.2 g/dL    HCT 31.7 (L) 41.1 - 50.3 %    MCV 93.2 79.6 - 97.8 FL    MCH 31.2 26.1 - 32.9 PG    MCHC 33.4 31.4 - 35.0 g/dL    RDW 15.9 (H) 11.9 - 14.6 %    PLATELET 257 (L) 333 - 450 K/uL    MPV 11.8 9.4 - 12.3 FL    ABSOLUTE NRBC 0.00 0.0 - 0.2 K/uL    DF AUTOMATED      NEUTROPHILS 85 (H) 43 - 78 %    LYMPHOCYTES 6 (L) 13 - 44 %    MONOCYTES 7 4.0 - 12.0 %    EOSINOPHILS 1 0.5 - 7.8 %    BASOPHILS 0 0.0 - 2.0 %    IMMATURE GRANULOCYTES 1 0.0 - 5.0 %    ABS. NEUTROPHILS 7.5 1.7 - 8.2 K/UL    ABS. LYMPHOCYTES 0.5 0.5 - 4.6 K/UL    ABS. MONOCYTES 0.6 0.1 - 1.3 K/UL    ABS. EOSINOPHILS 0.1 0.0 - 0.8 K/UL    ABS. BASOPHILS 0.0 0.0 - 0.2 K/UL    ABS. IMM. GRANS. 0.1 0.0 - 0.5 K/UL   METABOLIC PANEL, COMPREHENSIVE    Collection Time: 02/20/20  4:48 PM   Result Value Ref Range    Sodium 140 136 - 145 mmol/L    Potassium 3.6 3.5 - 5.1 mmol/L    Chloride 107 98 - 107 mmol/L    CO2 28 21 - 32 mmol/L    Anion gap 5 (L) 7 - 16 mmol/L    Glucose 111 (H) 65 - 100 mg/dL    BUN 19 8 - 23 MG/DL    Creatinine 2.00 (H) 0.8 - 1.5 MG/DL    GFR est AA 41 (L) >60 ml/min/1.73m2    GFR est non-AA 34 (L) >60 ml/min/1.73m2    Calcium 8.5 8.3 - 10.4 MG/DL    Bilirubin, total 0.6 0.2 - 1.1 MG/DL    ALT (SGPT) 11 (L) 12 - 65 U/L    AST (SGOT) 13 (L) 15 - 37 U/L    Alk.  phosphatase 80 50 - 136 U/L    Protein, total 6.5 6.3 - 8.2 g/dL    Albumin 3.3 3.2 - 4.6 g/dL    Globulin 3.2 2.3 - 3.5 g/dL    A-G Ratio 1.0 (L) 1.2 - 3.5     LACTIC ACID    Collection Time: 02/20/20  4:48 PM   Result Value Ref Range    Lactic acid 0.9 0.4 - 2.0 MMOL/L   INFLUENZA A & B AG (RAPID TEST)    Collection Time: 02/20/20  4:48 PM   Result Value Ref Range    Influenza A Ag NEGATIVE  NEG      Influenza B Ag NEGATIVE  NEG      Source NASOPHARYNGEAL URINE MICROSCOPIC    Collection Time: 02/20/20  5:05 PM   Result Value Ref Range    WBC >100 (H) 0 /hpf    RBC 20-50 0 /hpf    Epithelial cells 0 0 /hpf    Bacteria 4+ (H) 0 /hpf    Casts 5-10 0 /lpf     CXR Results  (Last 48 hours)               02/20/20 1717  XR CHEST PA LAT Final result    Impression:  IMPRESSION: No consolidation. Narrative:  AP LATERAL CHEST  2/20/2020 5:17 PM        HISTORY:  fever cough       COMPARISON: October 25, 2017       FINDINGS: A cardiac pacemaker device is present. EKG leads are present. There   is no lobar consolidation, pleural effusions or pulmonary edema. CT Results  (Last 48 hours)    None              Assessment and Plan: Active Hospital Problems    Diagnosis Date Noted    Sepsis secondary to UTI ([facility] [address] 02/20/2020     Principal Problem:    Sepsis secondary to UTI ([facility] [address] (2/20/2020)     Meets SIRS criteria with fever of 101. 5degF and RR of 38 on admission   Urologist appt showed culture revealing Morganella morganii resistant to Ceftriaxone and Ciprofloxacin   Start Cefepime IV for sepsis secondary to UTI   If pt's BP worsens, consider 30mL/kg NS IV bolus   Continue In and Out catheterization    HTN   Continue Hyzaar PO    CHF   Continue Furosemide    Afib   Continue Elliquis and Amiodarone      · PLAN General  · DVT prophylaxis:  Lovenox  · Code status: Full;  HCPOA:   · Risk: high  · Anticipated DC needs:  · Estimated LOS:  Greater than 2 midnights  · Plans discussed with patient and/or caregiver; questions answered. Parts of this document were created using dragon voice recognition software. The chart has been reviewed but errors may still be present    Med records reviewed if applicable; findings:     Critical care time if applicable:      Signed By: Julio C Fraire     February 20, 2020       *ATTENTION:  This note has been created by a medical student for educational purposes only.   Please do not refer to the content of this note for clinical decision-making, billing, or other purposes. Please see attending physicians note to obtain clinical information on this patient. *

## 2020-02-21 NOTE — PROGRESS NOTES
Cath difficult meets resistance. 350 ml urine drained from bladder. Pt has history of prostate cancer and says he has scar tissue.   Paged Dr. Nikolas Rush for Urology consult this admission,

## 2020-02-21 NOTE — PROGRESS NOTES
Went over self cath procedure and not contaminating sterile field. Education needed around cleaning around penis and meatus and keeping sterile. Pt difficult cath with straight cath. Straight cath yields 175 ml urine with sediment noted.

## 2020-02-21 NOTE — PROGRESS NOTES
informed pt and family of Spiritual Care services. Pt stated that \"he was up to date on this. \" No additional needs at this time. Please consult Spiritual Care as needed. Juan J Roman, [employer].

## 2020-02-21 NOTE — PROGRESS NOTES
Dual skin assessment with Jasper Hutton RN. Pt incontinent of urine. Excoriation noted. Pt noted to have moles on torso and back. Pt has skinned area on bilateral legs. Thick yellow toenails noted. Scars to back. Protective barrier cream added.

## 2020-02-21 NOTE — H&P
HOSPITALIST H&P  NAME:  Raya Oneill   Age:  80 y.o.  :   3/23/1932   MRN:   [id number]  PCP: Giovana Pearl NP  Treatment Team: Attending Provider: Sean Linares MD; Primary Nurse: Jameson Napier, RN    No Order     CC: Reason for admission is: Sepsis with UTI    HPI:   Patient history was obtained from the ER provider prior to seeing the patient. Patient is a 80 y.o. male who presents to the ER due to fever and possible UTI. Patient does self-catheterization at home, and has done this for about 10 years due to neurogenic bladder. He reports frequent UTIs. He did see his physician recently who started him on Cipro for a urine infection, he did not also saw his urologist a few days later. No culture results can be found. He reports that in general he is not feeling well. He denies nausea, vomiting, diarrhea, abdominal pain, cough, shortness of breath, or upper respiratory symptoms. He does report being around some people at [employer] that were sick with upper respiratory illnesses. Urinalysis done in the emergency room does show a urinary tract infection. ROS:  All systems have been reviewed and are negative except as stated in HPI or elsewhere.       Past Medical History:   Diagnosis Date    Arthritis     Back pain     CAD (coronary artery disease)     PCIs    Cardiac pacemaker 2016    Chronic kidney disease     Chronic pain     CKD (chronic kidney disease), stage III ([address] 2016    Coronary atherosclerosis of native coronary vessel 2016    Disorder of bladder     Dysuria 2013    Edema     GERD (gastroesophageal reflux disease)     Heart disease, unspecified 2013    Heart failure ([address]     Ill-defined condition     HLD    Malignant neoplasm of prostate ([address] 2013    Palpitations     Peripheral neuropathy 2016    Permanent atrial fibrillation 2017    Spinal stenosis 2017    Thyroid disease     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    Renal cyst    Unspecified essential hypertension 12/19/2013    Unspecified urinary incontinence 12/19/2013    Urgency of urination 12/19/2013    Urinary tract infection, site not specified       Past Surgical History:   Procedure Laterality Date    CARDIAC SURG PROCEDURE UNLIST      pacemaker    HX APPENDECTOMY      HX PACEMAKER      HX TONSILLECTOMY        Social History     Tobacco Use    Smoking status: Never Smoker    Smokeless tobacco: Never Used   Substance Use Topics    Alcohol use: No      Family History   Problem Relation Age of Onset    Hypertension Other     Diabetes Other     Cancer Other     Heart Disease Other     Asthma Other        FH Reviewed and non-contributory to admitting diagnosis    Allergies   Allergen Reactions    Anaprox [Naproxen Sodium] Unknown (comments)    Cataflam [Diclofenac Potassium] Unknown (comments)    Feldene [Piroxicam] Unknown (comments)    Flexeril [Cyclobenzaprine] Unknown (comments)    Keflex [Cephalexin] Unknown (comments)     Can tolerate per patient    Lotensin [Benazepril] Unknown (comments)    Relafen [Nabumetone] Unknown (comments)    Sulfa (Sulfonamide Antibiotics) Unknown (comments)     Can tolerate per patient      Prior to Admission Medications   Prescriptions Last Dose Informant Patient Reported? Taking? CRANBERRY FRUIT CONCENTRATE (AZO CRANBERRY PO)   Yes No   Sig: Take  by mouth. FOLIC ACID/MULTIVIT-MIN/LUTEIN (CENTRUM SILVER PO)   Yes No   Sig: Take  by mouth. acetaminophen (TYLENOL ARTHRITIS PAIN) 650 mg TbER   Yes No   Sig: Take 650 mg by mouth two (2) times a day. amiodarone (CORDARONE) 200 mg tablet   No No   Sig: Take 1 Tab by mouth daily. apixaban (ELIQUIS) 2.5 mg tablet   No No   Sig: Take 1 Tab by mouth two (2) times a day. ferrous sulfate (IRON) 325 mg (65 mg iron) tablet   Yes No   Sig: Take  by mouth Daily (before breakfast).    furosemide (LASIX) 40 mg tablet Not Taking at Unknown time  No No   Sig: Take 1 Tab by mouth daily. Indications: Peripheral Edema due to Chronic Heart Failure   Patient taking differently: Take 40 mg by mouth daily as needed. Indications: Peripheral Edema due to Chronic Heart Failure   gabapentin (NEURONTIN) 300 mg capsule   Yes No   Sig: Take 300 mg by mouth three (3) times daily. levothyroxine (SYNTHROID) 100 mcg tablet   No No   Sig: Take 1 Tab by mouth Daily (before breakfast). losartan-hydroCHLOROthiazide (HYZAAR) 100-12.5 mg per tablet   No No   Sig: Take 1 Tab by mouth daily. metoprolol succinate (TOPROL-XL) 50 mg XL tablet   No No   Sig: Take 1 Tab by mouth daily. pantoprazole (PROTONIX) 40 mg tablet   Yes No   Sig: Take 40 mg by mouth daily. potassium chloride (KLOR-CON) 10 mEq tablet Not Taking at Unknown time  No No   Sig: Take 1 Tab by mouth daily. Patient taking differently: Take 10 mEq by mouth daily as needed. pravastatin (PRAVACHOL) 40 mg tablet   Yes No   Sig: Take 40 mg by mouth nightly. psyllium husk (METAMUCIL) 0.4 gram cap   Yes No   Sig: Take  by mouth. traZODone (DESYREL) 50 mg tablet   Yes No   Sig: Take 200 mg by mouth nightly. Facility-Administered Medications: None         Objective:     No intake or output data in the 24 hours ending 20   Temp (24hrs), Av.9 °F (38.3 °C), Min:100.3 °F (37.9 °C), Max:101.5 °F (38.6 °C)    Oxygen Therapy  O2 Sat (%): 94 % (20)  Pulse via Oximetry: 85 beats per minute (20)   Body mass index is 23.63 kg/m².   Patient Vitals for the past 24 hrs:   Temp Pulse Resp BP SpO2   20  86 15 124/64 94 %   20  92 14 128/79    20  (!) 101 18 156/80 92 %   20  85 25  100 %   20  (!) 112 26 168/71 (!) 81 %   20 1832  97 20 133/63 96 %   20 1818  94 26 150/75 100 %   20 1747  83 20 130/66 100 %   20 1732  86 19 117/56 100 %   20 1720  84 30 108/60 99 %   20 1708 (!) 101.5 °F (38.6 °C)       02/20/20 1700  84 (!) 38 (!) 86/53 98 %   02/20/20 1636 100.3 °F (37.9 °C) 89 20 95/54 99 %     Physical Exam:    General:    WD and WN, No apparent distress. Unkempt  Head:   Normocephalic, without obvious abnormality, atraumatic. Eyes:  PERRL; EOMI; sclera normal/non-icteric  ENT:  Hearing is diminished. oropharynx is clear with tacky mucous membranes   Resp:    Clear/diminished to auscultation bilaterally. No Wheezing or Rhonchi. Resp are even and unlabored  Heart[de-identified]  Regular rate and rhythm,  no murmur,   No LE edema  Abdomen:   Soft, non-tender. Not distended. Bowel sounds normal.  hepato-splenomegaly -none  Musc/SK: Muscle strength is good and tone normal; No cyanosis. No clubbing  Skin:     Texture, turgor normal. No significant rashes or lesions. Capillary refill < 2 sec  Neurologic: CN II - XII are grossly intact - Eye exam as noted above  Psych: Alert and oriented x 4;  Judgement and insight are normal     Data Review:   Recent Results (from the past 24 hour(s))   EKG, 12 LEAD, INITIAL    Collection Time: 02/20/20  4:45 PM   Result Value Ref Range    Ventricular Rate 90 BPM    Atrial Rate 250 BPM    QRS Duration 98 ms    Q-T Interval 370 ms    QTC Calculation (Bezet) 452 ms    Calculated R Axis 76 degrees    Calculated T Axis -22 degrees    Diagnosis       !! AGE AND GENDER SPECIFIC ECG ANALYSIS !!   Atrial fibrillation  Possible Anterior infarct , age undetermined  ST & T wave abnormality, consider lateral ischemia or digitalis effect  Abnormal ECG  When compared with ECG of 06-MAY-2015 11:27,  Atrial fibrillation has replaced Electronic ventricular pacemaker     CBC WITH AUTOMATED DIFF    Collection Time: 02/20/20  4:48 PM   Result Value Ref Range    WBC 8.9 4.3 - 11.1 K/uL    RBC 3.40 (L) 4.23 - 5.6 M/uL    HGB 10.6 (L) 13.6 - 17.2 g/dL    HCT 31.7 (L) 41.1 - 50.3 %    MCV 93.2 79.6 - 97.8 FL    MCH 31.2 26.1 - 32.9 PG    MCHC 33.4 31.4 - 35.0 g/dL    RDW 15.9 (H) 11.9 - 14.6 %    PLATELET 830 (L) 128 - 450 K/uL    MPV 11.8 9.4 - 12.3 FL    ABSOLUTE NRBC 0.00 0.0 - 0.2 K/uL    DF AUTOMATED      NEUTROPHILS 85 (H) 43 - 78 %    LYMPHOCYTES 6 (L) 13 - 44 %    MONOCYTES 7 4.0 - 12.0 %    EOSINOPHILS 1 0.5 - 7.8 %    BASOPHILS 0 0.0 - 2.0 %    IMMATURE GRANULOCYTES 1 0.0 - 5.0 %    ABS. NEUTROPHILS 7.5 1.7 - 8.2 K/UL    ABS. LYMPHOCYTES 0.5 0.5 - 4.6 K/UL    ABS. MONOCYTES 0.6 0.1 - 1.3 K/UL    ABS. EOSINOPHILS 0.1 0.0 - 0.8 K/UL    ABS. BASOPHILS 0.0 0.0 - 0.2 K/UL    ABS. IMM. GRANS. 0.1 0.0 - 0.5 K/UL   METABOLIC PANEL, COMPREHENSIVE    Collection Time: 02/20/20  4:48 PM   Result Value Ref Range    Sodium 140 136 - 145 mmol/L    Potassium 3.6 3.5 - 5.1 mmol/L    Chloride 107 98 - 107 mmol/L    CO2 28 21 - 32 mmol/L    Anion gap 5 (L) 7 - 16 mmol/L    Glucose 111 (H) 65 - 100 mg/dL    BUN 19 8 - 23 MG/DL    Creatinine 2.00 (H) 0.8 - 1.5 MG/DL    GFR est AA 41 (L) >60 ml/min/1.73m2    GFR est non-AA 34 (L) >60 ml/min/1.73m2    Calcium 8.5 8.3 - 10.4 MG/DL    Bilirubin, total 0.6 0.2 - 1.1 MG/DL    ALT (SGPT) 11 (L) 12 - 65 U/L    AST (SGOT) 13 (L) 15 - 37 U/L    Alk.  phosphatase 80 50 - 136 U/L    Protein, total 6.5 6.3 - 8.2 g/dL    Albumin 3.3 3.2 - 4.6 g/dL    Globulin 3.2 2.3 - 3.5 g/dL    A-G Ratio 1.0 (L) 1.2 - 3.5     LACTIC ACID    Collection Time: 02/20/20  4:48 PM   Result Value Ref Range    Lactic acid 0.9 0.4 - 2.0 MMOL/L   INFLUENZA A & B AG (RAPID TEST)    Collection Time: 02/20/20  4:48 PM   Result Value Ref Range    Influenza A Ag NEGATIVE  NEG      Influenza B Ag NEGATIVE  NEG      Source NASOPHARYNGEAL     PROCALCITONIN    Collection Time: 02/20/20  4:48 PM   Result Value Ref Range    Procalcitonin 0.05 ng/mL   URINE MICROSCOPIC    Collection Time: 02/20/20  5:05 PM   Result Value Ref Range    WBC >100 (H) 0 /hpf    RBC 20-50 0 /hpf    Epithelial cells 0 0 /hpf    Bacteria 4+ (H) 0 /hpf    Casts 5-10 0 /lpf     CXR Results  (Last 48 hours) 02/20/20 1717  XR CHEST PA LAT Final result    Impression:  IMPRESSION: No consolidation. Narrative:  AP LATERAL CHEST  2/20/2020 5:17 PM        HISTORY:  fever cough       COMPARISON: October 25, 2017       FINDINGS: A cardiac pacemaker device is present. EKG leads are present. There   is no lobar consolidation, pleural effusions or pulmonary edema. CT Results  (Last 48 hours)    None              Assessment and Plan: Active Hospital Problems    Diagnosis Date Noted    Sepsis secondary to UTI ([address] 02/20/2020    Heart failure with preserved ejection fraction ([address] 11/06/2017    Neurogenic bladder 02/23/2017 2/20/20: Self caths x 10 years      Sick sinus syndrome ([address] 08/26/2016    CKD (chronic kidney disease), stage III ([address] 05/09/2016    Essential hypertension 12/19/2013     Principal Problem:    Sepsis secondary to UTI ([address] (2/20/2020)  IV fluids. IV antibiotics with cefepime. Last culture that can be found was shown to be resistant to Cipro and ceftriaxone. Active Problems:    Essential hypertension (12/19/2013)    Continue home meds and add prn hydralazine, if needed. CKD (chronic kidney disease), stage III ([address] (5/9/2016)    IVF as tolerated; avoid nephrotoxic medications      Sick sinus syndrome ([address] (8/26/2016)    Chronic condition is stable, but may affect hospital stay; continue home medications with the following changes, if any:    Will continue to monitor and adjust treatment as needed. Neurogenic bladder (2/23/2017)  Allow patient to continue to self cath, have nursing evaluate and instruct on proper technique if needed. Patient is somewhat unkempt in general.  He also reports frequent UTIs, I suspect he is not using good sterile technique.       Heart failure with preserved ejection fraction ([address] (11/6/2017)    Chronic condition is stable, but may affect hospital stay; continue home medications with the following changes, if any:    Will continue to monitor and adjust treatment as needed. · PLAN General  · DVT prophylaxis:  Lovenox  · Code status: Full;  HCPOA:   · Risk: high  · Anticipated DC needs:  · Estimated LOS:  Greater than 2 midnights  · Plans discussed with patient and/or caregiver; questions answered. Parts of this document were created using dragon voice recognition software.  The chart has been reviewed but errors may still be present    Med records reviewed if applicable; findings:     Critical care time if applicable:      Signed By: Oma Watts MD     February 20, 2020

## 2020-02-21 NOTE — CONSULTS
Urology Consult    Subjective:     Date of Consultation:  February 21, 2020    Referring Physician:  Swapna Chauhan MD    Reason for Consultation: NGB, hx of prostate cancer, difficulty with self cath    History of Present Illness:   Mr. Odie Severin is a 31-year-old male who presents to ER d/t fever and possible UTI. Pt self catheterizes. UA with >100 WBC, 20-50 RBC, +4 bacteria, urine culture with NGTD, blood cultures x 2 with NGTD. On IV cefepime. WBC 7.6. Cn 1.95. T-max 101.5 x 20 hours ago, currently afebrile, VSS. Has had several recent UTIs, treated by PCP. Known to Dr. Alicia Newberry with [facility]. Seen on 1/17/20 for urodynamic testing and this was cancelled due to pt having active UTI with urine culture growing >100K morganella morganii. He was previously treated with cipro, which most recent culture was resistant to. He has hx of prostate cancer s/p XRT 1998, urethral stricture. Last office note from 7/2019 mentions he will continue CIC 5-6 x per day, per pt preference, would consider urethroplasty in the future if symptoms worsen. He was scheduled for the urodynamics for further evaluation of incontinence but this was cancelled due to UTI. Pt has his home supply of self catheters at the bedside currently (14F coude) and he is now using these about 4 x per day while here in the hospital.  He reports he is doing well with this.         Past Medical History:   Diagnosis Date    Arthritis     Back pain     CAD (coronary artery disease)     PCIs    Cardiac pacemaker 5/9/2016    Chronic kidney disease     Chronic pain     CKD (chronic kidney disease), stage III ([address] 5/9/2016    Coronary atherosclerosis of native coronary vessel 5/9/2016    Disorder of bladder     Dysuria 12/19/2013    Edema     GERD (gastroesophageal reflux disease)     Heart disease, unspecified 12/19/2013    Heart failure ([address]     Ill-defined condition     HLD    Malignant neoplasm of prostate ([address] 12/19/2013    Palpitations     Peripheral neuropathy 5/9/2016    Permanent atrial fibrillation 2/23/2017    Spinal stenosis 8/30/2017    Thyroid disease     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    Renal cyst    Unspecified essential hypertension 12/19/2013    Unspecified urinary incontinence 12/19/2013    Urgency of urination 12/19/2013    Urinary tract infection, site not specified       Past Surgical History:   Procedure Laterality Date    CARDIAC SURG PROCEDURE UNLIST      pacemaker    HX APPENDECTOMY      HX PACEMAKER      HX TONSILLECTOMY        Family History   Problem Relation Age of Onset    Hypertension Other     Diabetes Other     Cancer Other     Heart Disease Other     Asthma Other       Social History     Tobacco Use    Smoking status: Never Smoker    Smokeless tobacco: Never Used   Substance Use Topics    Alcohol use: No     Allergies   Allergen Reactions    Anaprox [Naproxen Sodium] Unknown (comments)    Cataflam [Diclofenac Potassium] Unknown (comments)    Feldene [Piroxicam] Unknown (comments)    Flexeril [Cyclobenzaprine] Unknown (comments)    Keflex [Cephalexin] Unknown (comments)     Can tolerate per patient    Lotensin [Benazepril] Unknown (comments)    Relafen [Nabumetone] Unknown (comments)    Sulfa (Sulfonamide Antibiotics) Unknown (comments)     Can tolerate per patient      Prior to Admission medications    Medication Sig Start Date End Date Taking? Authorizing Provider   ferrous sulfate (IRON) 325 mg (65 mg iron) tablet Take  by mouth Daily (before breakfast). Yes Provider, Historical   gabapentin (NEURONTIN) 300 mg capsule Take 300 mg by mouth three (3) times daily. Yes Provider, Historical   apixaban (ELIQUIS) 2.5 mg tablet Take 1 Tab by mouth two (2) times a day. 1/8/20   Migdalia Gregg MD   losartan-hydroCHLOROthiazide [facility]) 100-12.5 mg per tablet Take 1 Tab by mouth daily.  6/28/19   Migdalia Gregg MD   amiodarone (CORDARONE) 200 mg tablet Take 1 Tab by mouth daily. 19   Linda Tinajero MD   metoprolol succinate (TOPROL-XL) 50 mg XL tablet Take 1 Tab by mouth daily. 18   Linda Tinajero MD   acetaminophen (TYLENOL ARTHRITIS PAIN) 650 mg TbER Take 650 mg by mouth two (2) times a day. Provider, Historical   levothyroxine (SYNTHROID) 100 mcg tablet Take 1 Tab by mouth Daily (before breakfast). 17   Linda Tinajero MD   potassium chloride (KLOR-CON) 10 mEq tablet Take 1 Tab by mouth daily. Patient taking differently: Take 10 mEq by mouth daily as needed. 10/26/17   Tosha Paez MD   furosemide (LASIX) 40 mg tablet Take 1 Tab by mouth daily. Indications: Peripheral Edema due to Chronic Heart Failure  Patient taking differently: Take 40 mg by mouth daily as needed. Indications: Peripheral Edema due to Chronic Heart Failure 10/26/17   Lianne Woodard MD   psyllium husk (METAMUCIL) 0.4 gram cap Take  by mouth. Provider, Historical   CRANBERRY FRUIT CONCENTRATE (AZO CRANBERRY PO) Take  by mouth. Provider, Historical   FOLIC ACID/MULTIVIT-MIN/LUTEIN (CENTRUM SILVER PO) Take  by mouth. Provider, Historical   pantoprazole (PROTONIX) 40 mg tablet Take 40 mg by mouth daily. Provider, Historical   traZODone (DESYREL) 50 mg tablet Take 200 mg by mouth nightly. Provider, Historical   pravastatin (PRAVACHOL) 40 mg tablet Take 40 mg by mouth nightly. Provider, Historical         Review of Systems:  A comprehensive review of systems was negative except for that written in the HPI.     Objective:     Patient Vitals for the past 8 hrs:   BP Temp Pulse Resp SpO2   20 1046 132/74 98 °F (36.7 °C) 97 18 96 %   20 0731 154/84 98.2 °F (36.8 °C) (!) 113 19 98 %     Temp (24hrs), Av.8 °F (37.1 °C), Min:97.4 °F (36.3 °C), Max:101.5 °F (38.6 °C)      Intake and Output:    1901 -  0700  In: 925 [I.V.:925]  Out: 525 [Urine:525]    Physical Exam:            General:    alert, cooperative, no distress Skin:  no rash or abnormalities                HEENT:  PERRLA        Throat/Neck:  neck supple                     Lungs:  clear to auscultation bilaterally      Cardiovascular:  RRR, S1 S2             Abdomen[de-identified]  soft, non-tender           : No abnormalities seen on exam, pt doing CIC QID          Extremities:  peripheral pulses 2+ and symmetric       Assessment:     Principal Problem:    Sepsis secondary to UTI ([address] (2/20/2020)    Active Problems:    Essential hypertension (12/19/2013)      CKD (chronic kidney disease), stage III ([address] (5/9/2016)      Sick sinus syndrome ([address] (8/26/2016)      Neurogenic bladder (2/23/2017)      Overview: 2/20/20: Self caths x 10 years      Heart failure with preserved ejection fraction ([address] (11/6/2017)      UTI. Hx prostate cancer s/p XRT, hx urethral stricture, NGB. Plan:     Continue treatment for UTI. Pt doing well with self catheterization now that he has his supplies from home (14F coude catheters). Continue this 4 x daily. He will need to f/u asap with his primary urologist, Dr. Frannie Montaño, at time of d/c in order to revisit urodynamic testing and discussion of urethroplasty. He says he has upcoming appt scheduled in the next 2 weeks. Call with questions/concerns. Thank you for the opportunity to assist in the care of this patient. Signed By: Ashlyn Douglas NP    now using 14 Fr coude catheters without difficulty. He will follow up with urologist at [facility]. I have reviewed the above note and examined the patient. I agree with the exam, assessment and plan.     Yvette Maradiaga MD

## 2020-02-21 NOTE — PROGRESS NOTES
Pt in bed resting rr are even and unlabored lung sounds are diminished no distress noted at current time. abd is soft bowel sounds are active. Skin intact no new issues noted. Iv fluids infusing. Safety measures in place will continue to monitor.

## 2020-02-21 NOTE — PROGRESS NOTES
TRANSFER - IN REPORT:    Verbal report received from Manohar Hinojosa RN on Chiara Nixon  being received from ED for routine progression of care      Report consisted of patients Situation, Background, Assessment and   Recommendations(SBAR). Information from the following report(s) ED Summary was reviewed with the receiving nurse. Opportunity for questions and clarification was provided. Assessment completed upon patients arrival to unit and care assumed.

## 2020-02-21 NOTE — PROGRESS NOTES
Hospitalist Note     Admit Date:  2020  4:33 PM   Name:  Robert Florez   Age:  80 y.o.  :  3/23/1932   MRN:  [id number]   PCP:  Faby Olguin NP  Treatment Team: Attending Provider: Bishop Nydia MD; Consulting Provider: Richelle Goins MD; Primary Nurse: Pippa Randolph; Utilization Review: Sage Weiss RN; Care Manager: Tuan Flower    HPI/Subjective:   Patient seen and examined. Overall feeling better. Denies abdominal pain. No nausea or vomiting. No other complaints  Objective:     Patient Vitals for the past 24 hrs:   Temp Pulse Resp BP SpO2   20 1046 98 °F (36.7 °C) 97 18 132/74 96 %   20 0731 98.2 °F (36.8 °C) (!) 113 19 154/84 98 %   20 0413 98.4 °F (36.9 °C) (!) 104 19 150/78 97 %   20 2341 97.9 °F (36.6 °C) 92 18 140/83 98 %   20 2136 97.4 °F (36.3 °C) 84 18 133/73 97 %   20 1947 98.5 °F (36.9 °C) 86 15 124/64 94 %   20 1932  92 14 128/79    20 1924  (!) 101 18 156/80 92 %   20 1904  85 25  100 %   20 1903  (!) 112 26 168/71 (!) 81 %   20 1832  97 20 133/63 96 %   20 1818  94 26 150/75 100 %   20 1747  83 20 130/66 100 %   20 1732  86 19 117/56 100 %   20 1720  84 30 108/60 99 %   20 1708 (!) 101.5 °F (38.6 °C)       20 1700  84 (!) 38 (!) 86/53 98 %   20 1636 100.3 °F (37.9 °C) 89 20 95/54 99 %     Oxygen Therapy  O2 Sat (%): 96 % (20 1046)  Pulse via Oximetry: 85 beats per minute (20 1947)    Estimated body mass index is 23.63 kg/m² as calculated from the following:    Height as of this encounter: 5' 9\" (1.753 m). Weight as of this encounter: 72.6 kg (160 lb).       Intake/Output Summary (Last 24 hours) at 2020 1309  Last data filed at 2020 1209  Gross per 24 hour   Intake 1165 ml   Output 525 ml   Net 640 ml       *Note that automatically entered I/Os may not be accurate; dependent on patient compliance with collection and accurate  by [email or web address]. General:    Well nourished. Alert. CV:   RRR. No murmur, rub, or gallop. Lungs:   CTAB. No wheezing, rhonchi, or rales. Abdomen:   Soft, nontender, nondistended. Extremities: Warm and dry. No cyanosis or edema. Skin:     No rashes or jaundice. Neuro:  No gross focal deficits    Data Review:  I have reviewed all labs, meds, and studies from the last 24 hours:    Recent Results (from the past 24 hour(s))   CULTURE, BLOOD    Collection Time: 02/20/20  4:45 PM   Result Value Ref Range    Special Requests: LEFT  FOREARM        Culture result: NO GROWTH AFTER 12 HOURS     EKG, 12 LEAD, INITIAL    Collection Time: 02/20/20  4:45 PM   Result Value Ref Range    Ventricular Rate 90 BPM    Atrial Rate 250 BPM    QRS Duration 98 ms    Q-T Interval 370 ms    QTC Calculation (Bezet) 452 ms    Calculated R Axis 76 degrees    Calculated T Axis -22 degrees    Diagnosis       !! AGE AND GENDER SPECIFIC ECG ANALYSIS !! Atrial fibrillation  Possible Anterior infarct , age undetermined  ST & T wave abnormality, consider lateral ischemia or digitalis effect  Abnormal ECG  When compared with ECG of 06-MAY-2015 11:27,  Atrial fibrillation has replaced Electronic ventricular pacemaker  Confirmed by RAFAL MURRAY (), Bal Kwon ([id number]) on 2/21/2020 7:21:38 AM     CBC WITH AUTOMATED DIFF    Collection Time: 02/20/20  4:48 PM   Result Value Ref Range    WBC 8.9 4.3 - 11.1 K/uL    RBC 3.40 (L) 4.23 - 5.6 M/uL    HGB 10.6 (L) 13.6 - 17.2 g/dL    HCT 31.7 (L) 41.1 - 50.3 %    MCV 93.2 79.6 - 97.8 FL    MCH 31.2 26.1 - 32.9 PG    MCHC 33.4 31.4 - 35.0 g/dL    RDW 15.9 (H) 11.9 - 14.6 %    PLATELET 991 (L) 808 - 450 K/uL    MPV 11.8 9.4 - 12.3 FL    ABSOLUTE NRBC 0.00 0.0 - 0.2 K/uL    DF AUTOMATED      NEUTROPHILS 85 (H) 43 - 78 %    LYMPHOCYTES 6 (L) 13 - 44 %    MONOCYTES 7 4.0 - 12.0 %    EOSINOPHILS 1 0.5 - 7.8 %    BASOPHILS 0 0.0 - 2.0 %    IMMATURE GRANULOCYTES 1 0.0 - 5.0 %    ABS. NEUTROPHILS 7.5 1.7 - 8.2 K/UL    ABS. LYMPHOCYTES 0.5 0.5 - 4.6 K/UL    ABS. MONOCYTES 0.6 0.1 - 1.3 K/UL    ABS. EOSINOPHILS 0.1 0.0 - 0.8 K/UL    ABS. BASOPHILS 0.0 0.0 - 0.2 K/UL    ABS. IMM. GRANS. 0.1 0.0 - 0.5 K/UL   METABOLIC PANEL, COMPREHENSIVE    Collection Time: 02/20/20  4:48 PM   Result Value Ref Range    Sodium 140 136 - 145 mmol/L    Potassium 3.6 3.5 - 5.1 mmol/L    Chloride 107 98 - 107 mmol/L    CO2 28 21 - 32 mmol/L    Anion gap 5 (L) 7 - 16 mmol/L    Glucose 111 (H) 65 - 100 mg/dL    BUN 19 8 - 23 MG/DL    Creatinine 2.00 (H) 0.8 - 1.5 MG/DL    GFR est AA 41 (L) >60 ml/min/1.73m2    GFR est non-AA 34 (L) >60 ml/min/1.73m2    Calcium 8.5 8.3 - 10.4 MG/DL    Bilirubin, total 0.6 0.2 - 1.1 MG/DL    ALT (SGPT) 11 (L) 12 - 65 U/L    AST (SGOT) 13 (L) 15 - 37 U/L    Alk. phosphatase 80 50 - 136 U/L    Protein, total 6.5 6.3 - 8.2 g/dL    Albumin 3.3 3.2 - 4.6 g/dL    Globulin 3.2 2.3 - 3.5 g/dL    A-G Ratio 1.0 (L) 1.2 - 3.5     LACTIC ACID    Collection Time: 02/20/20  4:48 PM   Result Value Ref Range    Lactic acid 0.9 0.4 - 2.0 MMOL/L   INFLUENZA A & B AG (RAPID TEST)    Collection Time: 02/20/20  4:48 PM   Result Value Ref Range    Influenza A Ag NEGATIVE  NEG      Influenza B Ag NEGATIVE  NEG      Source NASOPHARYNGEAL     PROCALCITONIN    Collection Time: 02/20/20  4:48 PM   Result Value Ref Range    Procalcitonin 0.05 ng/mL   CULTURE, BLOOD    Collection Time: 02/20/20  4:51 PM   Result Value Ref Range    Special Requests: RIGHT  Antecubital        Culture result: NO GROWTH AFTER 12 HOURS     URINE MICROSCOPIC    Collection Time: 02/20/20  5:05 PM   Result Value Ref Range    WBC >100 (H) 0 /hpf    RBC 20-50 0 /hpf    Epithelial cells 0 0 /hpf    Bacteria 4+ (H) 0 /hpf    Casts 5-10 0 /lpf   CULTURE, URINE    Collection Time: 02/20/20  5:45 PM   Result Value Ref Range    Special Requests: NO SPECIAL REQUESTS      Culture result:        NO GROWTH AFTER SHORT PERIOD OF INCUBATION.  FURTHER RESULTS TO FOLLOW AFTER OVERNIGHT INCUBATION. METABOLIC PANEL, COMPREHENSIVE    Collection Time: 02/21/20  7:22 AM   Result Value Ref Range    Sodium 143 136 - 145 mmol/L    Potassium 3.6 3.5 - 5.1 mmol/L    Chloride 110 (H) 98 - 107 mmol/L    CO2 26 21 - 32 mmol/L    Anion gap 7 7 - 16 mmol/L    Glucose 101 (H) 65 - 100 mg/dL    BUN 20 8 - 23 MG/DL    Creatinine 1.95 (H) 0.8 - 1.5 MG/DL    GFR est AA 42 (L) >60 ml/min/1.73m2    GFR est non-AA 35 (L) >60 ml/min/1.73m2    Calcium 8.3 8.3 - 10.4 MG/DL    Bilirubin, total 0.6 0.2 - 1.1 MG/DL    ALT (SGPT) 11 (L) 12 - 65 U/L    AST (SGOT) 11 (L) 15 - 37 U/L    Alk. phosphatase 76 50 - 136 U/L    Protein, total 6.4 6.3 - 8.2 g/dL    Albumin 3.2 3.2 - 4.6 g/dL    Globulin 3.2 2.3 - 3.5 g/dL    A-G Ratio 1.0 (L) 1.2 - 3.5     CBC WITH AUTOMATED DIFF    Collection Time: 02/21/20  7:22 AM   Result Value Ref Range    WBC 7.6 4.3 - 11.1 K/uL    RBC 3.42 (L) 4.23 - 5.6 M/uL    HGB 10.6 (L) 13.6 - 17.2 g/dL    HCT 32.3 (L) 41.1 - 50.3 %    MCV 94.4 79.6 - 97.8 FL    MCH 31.0 26.1 - 32.9 PG    MCHC 32.8 31.4 - 35.0 g/dL    RDW 15.9 (H) 11.9 - 14.6 %    PLATELET 011 (L) 801 - 450 K/uL    MPV 12.2 9.4 - 12.3 FL    ABSOLUTE NRBC 0.00 0.0 - 0.2 K/uL    DF AUTOMATED      NEUTROPHILS 75 43 - 78 %    LYMPHOCYTES 11 (L) 13 - 44 %    MONOCYTES 11 4.0 - 12.0 %    EOSINOPHILS 1 0.5 - 7.8 %    BASOPHILS 0 0.0 - 2.0 %    IMMATURE GRANULOCYTES 2 0.0 - 5.0 %    ABS. NEUTROPHILS 5.7 1.7 - 8.2 K/UL    ABS. LYMPHOCYTES 0.9 0.5 - 4.6 K/UL    ABS. MONOCYTES 0.8 0.1 - 1.3 K/UL    ABS. EOSINOPHILS 0.1 0.0 - 0.8 K/UL    ABS. BASOPHILS 0.0 0.0 - 0.2 K/UL    ABS. IMM.  GRANS. 0.1 0.0 - 0.5 K/UL        All Micro Results     Procedure Component Value Units Date/Time    CULTURE, URINE [[id number]] Collected:  02/20/20 1745    Order Status:  Completed Specimen:  Urine from Clean catch Updated:  02/21/20 0840     Special Requests: NO SPECIAL REQUESTS        Culture result:       NO GROWTH AFTER SHORT PERIOD OF INCUBATION. FURTHER RESULTS TO FOLLOW AFTER OVERNIGHT INCUBATION. CULTURE, BLOOD [[id number]] Collected:  02/20/20 1645    Order Status:  Completed Specimen:  Blood Updated:  02/21/20 0621     Special Requests: --        LEFT  FOREARM       Culture result: NO GROWTH AFTER 12 HOURS       CULTURE, BLOOD [[id number]] Collected:  02/20/20 1651    Order Status:  Completed Specimen:  Blood Updated:  02/21/20 0621     Special Requests: --        RIGHT  Antecubital       Culture result: NO GROWTH AFTER 12 HOURS       INFLUENZA A & B AG (RAPID TEST) [[id number]] Collected:  02/20/20 1648    Order Status:  Completed Specimen:  Nasopharyngeal from Nasal washing Updated:  02/20/20 1736     Influenza A Ag NEGATIVE         Comment: NEGATIVE FOR THE PRESENCE OF INFLUENZA A ANTIGEN  INFECTION DUE TO INFLUENZA A CANNOT BE RULED OUT. BECAUSE THE ANTIGEN PRESENT IN THE SAMPLE MAY BE BELOW  THE DETECTION LIMIT OF THE TEST. A NEGATIVE TEST IS PRESUMPTIVE AND IT IS RECOMMENDED THAT THESE RESULTS BE CONFIRMED BY VIRAL CULTURE OR AN FDA-CLEARED INFLUENZA A AND B MOLECULAR ASSAY. Influenza B Ag NEGATIVE         Comment: NEGATIVE FOR THE PRESENCE OF INFLUENZA B ANTIGEN  INFECTION DUE TO INFLUENZA B CANNOT BE RULED OUT. BECAUSE THE ANTIGEN PRESENT IN THE SAMPLE MAY BE BELOW  THE DETECTION LIMIT OF THE TEST. A NEGATIVE TEST IS PRESUMPTIVE AND IT IS RECOMMENDED THAT THESE RESULTS BE CONFIRMED BY VIRAL CULTURE OR AN FDA-CLEARED INFLUENZA A AND B MOLECULAR ASSAY.           Source NASOPHARYNGEAL             Current Meds:  Current Facility-Administered Medications   Medication Dose Route Frequency    acetaminophen (TYLENOL) tablet 650 mg  650 mg Oral Q6H PRN    amiodarone (CORDARONE) tablet 200 mg  200 mg Oral DAILY    apixaban (ELIQUIS) tablet 2.5 mg  2.5 mg Oral BID    furosemide (LASIX) tablet 40 mg  40 mg Oral DAILY PRN    gabapentin (NEURONTIN) capsule 300 mg  300 mg Oral TID    levothyroxine (SYNTHROID) tablet 100 mcg 100 mcg Oral ACB    metoprolol succinate (TOPROL-XL) XL tablet 50 mg  50 mg Oral DAILY    pantoprazole (PROTONIX) tablet 40 mg  40 mg Oral DAILY    pravastatin (PRAVACHOL) tablet 40 mg  40 mg Oral QHS    traZODone (DESYREL) tablet 200 mg  200 mg Oral QHS    0.9% sodium chloride infusion  125 mL/hr IntraVENous CONTINUOUS    sodium chloride (NS) flush 5-40 mL  5-40 mL IntraVENous Q8H    sodium chloride (NS) flush 5-40 mL  5-40 mL IntraVENous PRN    bisacodyL (DULCOLAX) tablet 5 mg  5 mg Oral DAILY PRN    LORazepam (ATIVAN) tablet 0.5 mg  0.5 mg Oral BID PRN    oxyCODONE IR (ROXICODONE) tablet 5 mg  5 mg Oral Q4H PRN    naloxone (NARCAN) injection 0.4 mg  0.4 mg IntraVENous PRN    cefepime (MAXIPIME) 1 g in 0.9% sodium chloride (MBP/ADV) 50 mL  1 g IntraVENous Q24H    losartan/hydroCHLOROthiazide (HYZAAR) 100/12.5 mg   Oral DAILY       Other Studies:  No results found for this visit on 02/20/20. Xr Chest Pa Lat    Result Date: 2/20/2020  AP LATERAL CHEST  2/20/2020 5:17 PM HISTORY:  fever cough COMPARISON: October 25, 2017 FINDINGS: A cardiac pacemaker device is present. EKG leads are present. There is no lobar consolidation, pleural effusions or pulmonary edema. IMPRESSION: No consolidation.        Assessment and Plan:     Hospital Problems as of 2/21/2020 Date Reviewed: 7/24/2015          Codes Class Noted - Resolved POA    * (Principal) Sepsis secondary to UTI [facility]) ICD-10-CM: A41.9, N39.0  ICD-9-CM: 038.9, 995.91, 599.0  2/20/2020 - Present Yes        Heart failure with preserved ejection fraction (HCC) (Chronic) ICD-10-CM: I50.30  ICD-9-CM: 428.9  11/6/2017 - Present Yes        Neurogenic bladder (Chronic) ICD-10-CM: N31.9  ICD-9-CM: 596.54  2/23/2017 - Present Yes    Overview Signed 2/20/2020  8:01 PM by Natalee Hernandez MD     2/20/20: Self caths x 10 years             Sick sinus syndrome ([facility] [address] (Chronic) ICD-10-CM: I49.5  ICD-9-CM: 427.81  8/26/2016 - Present Yes        CKD (chronic kidney disease), stage III ([facility] [address] (Chronic) ICD-10-CM: N18.3  ICD-9-CM: 585.3  5/9/2016 - Present Yes        Essential hypertension (Chronic) ICD-10-CM: I10  ICD-9-CM: 401.9  12/19/2013 - Present Yes              Plan:  1. Sepsis secondary to UTI - Possibly related to chronic self-catheterization. continue cefepime. Symptomatic treatment. Follow-up culture obtained in emergency room. Overall sepsis has improved. 2.  Neurogenic bladder: Continue self-catheterization. Urology consult also requested as patient has recurrent UTI. Follow-up recommendation. 3.  History of CHF: Appears compensated. Continue to monitor closely. Continue home medications    4. History of hypertension: Monitor blood pressure. Continue home medications    5. History of atrial fibrillation: Continue Eliquis, metoprolol and amiodarone. 6.  Chronic other medical problems: Monitor. Continue home medication as ordered    DC planning/Dispo: Likely discharge home in 1-2 days. Diet:  DIET REGULAR  DVT ppx:  On Eliquis    Signed:  Jey Sharpe MD

## 2020-02-22 ENCOUNTER — HOME HEALTH ADMISSION (OUTPATIENT)
Dept: HOME HEALTH SERVICES | Facility: HOME HEALTH | Age: 85
End: 2020-02-22

## 2020-02-22 VITALS
OXYGEN SATURATION: 95 % | SYSTOLIC BLOOD PRESSURE: 166 MMHG | TEMPERATURE: 98.1 F | RESPIRATION RATE: 17 BRPM | WEIGHT: 165.3 LBS | HEART RATE: 98 BPM | BODY MASS INDEX: 24.48 KG/M2 | HEIGHT: 69 IN | DIASTOLIC BLOOD PRESSURE: 79 MMHG

## 2020-02-22 LAB
ALBUMIN SERPL-MCNC: 3.2 G/DL (ref 3.2–4.6)
ALBUMIN/GLOB SERPL: 1 {RATIO} (ref 1.2–3.5)
ALP SERPL-CCNC: 74 U/L (ref 50–136)
ALT SERPL-CCNC: 13 U/L (ref 12–65)
ANION GAP SERPL CALC-SCNC: 8 MMOL/L (ref 7–16)
AST SERPL-CCNC: 15 U/L (ref 15–37)
BASOPHILS # BLD: 0 K/UL (ref 0–0.2)
BASOPHILS NFR BLD: 0 % (ref 0–2)
BILIRUB SERPL-MCNC: 0.6 MG/DL (ref 0.2–1.1)
BUN SERPL-MCNC: 24 MG/DL (ref 8–23)
CALCIUM SERPL-MCNC: 8.1 MG/DL (ref 8.3–10.4)
CHLORIDE SERPL-SCNC: 112 MMOL/L (ref 98–107)
CO2 SERPL-SCNC: 24 MMOL/L (ref 21–32)
CREAT SERPL-MCNC: 1.76 MG/DL (ref 0.8–1.5)
DIFFERENTIAL METHOD BLD: ABNORMAL
EOSINOPHIL # BLD: 0.1 K/UL (ref 0–0.8)
EOSINOPHIL NFR BLD: 1 % (ref 0.5–7.8)
ERYTHROCYTE [DISTWIDTH] IN BLOOD BY AUTOMATED COUNT: 15.9 % (ref 11.9–14.6)
GLOBULIN SER CALC-MCNC: 3.2 G/DL (ref 2.3–3.5)
GLUCOSE SERPL-MCNC: 91 MG/DL (ref 65–100)
HCT VFR BLD AUTO: 31.9 % (ref 41.1–50.3)
HGB BLD-MCNC: 10.7 G/DL (ref 13.6–17.2)
IMM GRANULOCYTES # BLD AUTO: 0.1 K/UL (ref 0–0.5)
IMM GRANULOCYTES NFR BLD AUTO: 1 % (ref 0–5)
LYMPHOCYTES # BLD: 1.5 K/UL (ref 0.5–4.6)
LYMPHOCYTES NFR BLD: 23 % (ref 13–44)
MAGNESIUM SERPL-MCNC: 1.8 MG/DL (ref 1.8–2.4)
MCH RBC QN AUTO: 31.1 PG (ref 26.1–32.9)
MCHC RBC AUTO-ENTMCNC: 33.5 G/DL (ref 31.4–35)
MCV RBC AUTO: 92.7 FL (ref 79.6–97.8)
MONOCYTES # BLD: 0.6 K/UL (ref 0.1–1.3)
MONOCYTES NFR BLD: 10 % (ref 4–12)
NEUTS SEG # BLD: 4 K/UL (ref 1.7–8.2)
NEUTS SEG NFR BLD: 65 % (ref 43–78)
NRBC # BLD: 0 K/UL (ref 0–0.2)
PHOSPHATE SERPL-MCNC: 2.7 MG/DL (ref 2.3–3.7)
PLATELET # BLD AUTO: 119 K/UL (ref 150–450)
PMV BLD AUTO: 12 FL (ref 9.4–12.3)
POTASSIUM SERPL-SCNC: 3.5 MMOL/L (ref 3.5–5.1)
PROT SERPL-MCNC: 6.4 G/DL (ref 6.3–8.2)
RBC # BLD AUTO: 3.44 M/UL (ref 4.23–5.6)
SODIUM SERPL-SCNC: 144 MMOL/L (ref 136–145)
WBC # BLD AUTO: 6.2 K/UL (ref 4.3–11.1)

## 2020-02-22 PROCEDURE — 85025 COMPLETE CBC W/AUTO DIFF WBC: CPT

## 2020-02-22 PROCEDURE — 74011250637 HC RX REV CODE- 250/637: Performed by: FAMILY MEDICINE

## 2020-02-22 PROCEDURE — 84100 ASSAY OF PHOSPHORUS: CPT

## 2020-02-22 PROCEDURE — 83735 ASSAY OF MAGNESIUM: CPT

## 2020-02-22 PROCEDURE — 74011250636 HC RX REV CODE- 250/636: Performed by: FAMILY MEDICINE

## 2020-02-22 PROCEDURE — 80053 COMPREHEN METABOLIC PANEL: CPT

## 2020-02-22 PROCEDURE — 36415 COLL VENOUS BLD VENIPUNCTURE: CPT

## 2020-02-22 RX ORDER — FUROSEMIDE 40 MG/1
40 TABLET ORAL
Qty: 10 TAB | Refills: 0 | Status: SHIPPED | OUTPATIENT
Start: 2020-02-22

## 2020-02-22 RX ORDER — CEFDINIR 300 MG/1
300 CAPSULE ORAL 2 TIMES DAILY
Qty: 10 CAP | Refills: 0 | Status: SHIPPED | OUTPATIENT
Start: 2020-02-22 | End: 2020-02-27

## 2020-02-22 RX ADMIN — HYDROCHLOROTHIAZIDE: 12.5 CAPSULE ORAL at 09:19

## 2020-02-22 RX ADMIN — Medication 10 ML: at 05:04

## 2020-02-22 RX ADMIN — LEVOTHYROXINE SODIUM 100 MCG: 100 TABLET ORAL at 06:13

## 2020-02-22 RX ADMIN — METOPROLOL SUCCINATE 50 MG: 50 TABLET, EXTENDED RELEASE ORAL at 09:19

## 2020-02-22 RX ADMIN — PANTOPRAZOLE SODIUM 40 MG: 40 TABLET, DELAYED RELEASE ORAL at 09:19

## 2020-02-22 RX ADMIN — GABAPENTIN 300 MG: 300 CAPSULE ORAL at 09:19

## 2020-02-22 RX ADMIN — SODIUM CHLORIDE 125 ML/HR: 900 INJECTION, SOLUTION INTRAVENOUS at 05:13

## 2020-02-22 RX ADMIN — APIXABAN 2.5 MG: 2.5 TABLET, FILM COATED ORAL at 09:20

## 2020-02-22 RX ADMIN — AMIODARONE HYDROCHLORIDE 200 MG: 200 TABLET ORAL at 09:19

## 2020-02-22 NOTE — PROGRESS NOTES
Pt in room alert and oriented. rr are even and unlabored he is on room air. Lung sounds are diminished. On room air. abd soft bowel sounds are active. Pt self caths he has been educated on correct sterile technique he verbalized understanding and is still contaminating when he caths. Family have also been educated and they have verbalized understanding. Skin intact safety measures in place will continue to monitor.

## 2020-02-22 NOTE — PROGRESS NOTES
Pt took his own iv out. Pressure was held and area dressed. Pt is stable at current time. When asked why pt removed his iv he stated \"I dont need it\". Safety measures in place will continue to monitor.

## 2020-02-22 NOTE — DISCHARGE SUMMARY
Hospitalist Discharge Summary     Admit Date:  2020  4:33 PM   Name:  Asher Renee   Age:  80 y.o.  :  3/23/1932   MRN:  [id number]   PCP:  Luis Cano NP  Treatment Team: Attending Provider: Christiano Goldberg MD; Consulting Provider: Danita Chavez MD; Utilization Review: Cate Brasher RN; Care Manager: Kwaku Kidd.; Primary Nurse: June COOPER    Problem List for this Hospitalization:  Hospital Problems as of 2020 Date Reviewed: 2015          Codes Class Noted - Resolved POA    Self-catheterizes urinary bladder (Chronic) ICD-10-CM: Z78.9  ICD-9-CM: V49.89  2020 - Present Yes        * (Principal) Sepsis secondary to UTI [facility]) ICD-10-CM: A41.9, N39.0  ICD-9-CM: 038.9, 995.91, 599.0  2020 - Present Yes        Heart failure with preserved ejection fraction (HCC) (Chronic) ICD-10-CM: I50.30  ICD-9-CM: 428.9  2017 - Present Yes        Neurogenic bladder (Chronic) ICD-10-CM: N31.9  ICD-9-CM: 596.54  2017 - Present Yes    Overview Signed 2020  8:01 PM by Christiano Goldberg MD     20: Self caths x 10 years             Sick sinus syndrome ([facility] [address] (Chronic) ICD-10-CM: I49.5  ICD-9-CM: 427.81  2016 - Present Yes        CKD (chronic kidney disease), stage III ([facility] [address] (Chronic) ICD-10-CM: N18.3  ICD-9-CM: 585.3  2016 - Present Yes        Essential hypertension (Chronic) ICD-10-CM: I10  ICD-9-CM: 401.9  2013 - Present Yes                Admission HPI from 2020: As per HPI \" Patient is a 80 y.o. male who presents to the ER due to fever and possible UTI. Patient does self-catheterization at home, and has done this for about 10 years due to neurogenic bladder. He reports frequent UTIs. He did see his physician recently who started him on Cipro for a urine infection, he did not also saw his urologist a few days later. No culture results can be found. He reports that in general he is not feeling well.   He denies nausea, vomiting, diarrhea, abdominal pain, cough, shortness of breath, or upper respiratory symptoms. He does report being around some people at [employer] that were sick with upper respiratory illnesses. Urinalysis done in the emergency room does show a urinary tract infection. \"    Hospital Course: During hospitalization, patient was put on IV antibiotic and IV hydration. Sepsis resolved. Patient UTI likely related to self-catheterization. It is not sure whether patient is using sterile technique for self-catheterization. Patient advised to use sterile technique and follow-up with urology regularly. Urology consult requested during hospitalization. Recommended to continue follow-up with primary urology as outpatient. Urine culture is not growing any bacteria till today. Clinically patient is doing much better. Remain afebrile. Patient wants to go home with clinical improvement. Home health services requested upon discharge. Case management team notified regarding physical therapy for weakness and nurse for medical management along with education of sterile technique for self-catheterization. Clinically stable to discharge home on oral antibiotic and outpatient follow-up. Return to ER instruction given. Urine culture is showing gram-negative rods now. Patient is clinically doing better and wants to go home. Patient advised to continue cefdinir. Advised to call primary care physician on Monday and follow-up for final urine culture report. Patient also advised to return to emergency room if he has any unusual symptoms. Patient denies any allergy to Keflex currently. Disposition: Home Health Care [facility]  Activity: Activity as tolerated  Diet: DIET CARDIAC Regular  Code Status: Full Code    Follow up instructions, discharge meds at bottom of this note. Plan was discussed with Patient. All questions answered. Patient was stable at time of discharge.   Patient will call a physician or return if any concerns. Discharge summary was sent to PCP electronically via \"Comm Mgt\" link in [facility], if possible. Diagnostic Imaging/Tests:   Xr Chest Pa Lat    Result Date: 2/20/2020  AP LATERAL CHEST  2/20/2020 5:17 PM HISTORY:  fever cough COMPARISON: October 25, 2017 FINDINGS: A cardiac pacemaker device is present. EKG leads are present. There is no lobar consolidation, pleural effusions or pulmonary edema. IMPRESSION: No consolidation. Echocardiogram results:  No results found for this visit on 02/20/20. Procedures done this admission:  * No surgery found *    All Micro Results     Procedure Component Value Units Date/Time    CULTURE, BLOOD [[id number]] Collected:  02/20/20 1645    Order Status:  Completed Specimen:  Blood Updated:  02/22/20 0654     Special Requests: --        LEFT  FOREARM       Culture result: NO GROWTH 2 DAYS       CULTURE, BLOOD [[id number]] Collected:  02/20/20 1651    Order Status:  Completed Specimen:  Blood Updated:  02/22/20 0654     Special Requests: --        RIGHT  Antecubital       Culture result: NO GROWTH 2 DAYS       CULTURE, URINE [[id number]] Collected:  02/20/20 1745    Order Status:  Completed Specimen:  Urine from Clean catch Updated:  02/21/20 0807     Special Requests: NO SPECIAL REQUESTS        Culture result:       NO GROWTH AFTER SHORT PERIOD OF INCUBATION. FURTHER RESULTS TO FOLLOW AFTER OVERNIGHT INCUBATION. INFLUENZA A & B AG (RAPID TEST) [[id number]] Collected:  02/20/20 1648    Order Status:  Completed Specimen:  Nasopharyngeal from Nasal washing Updated:  02/20/20 1736     Influenza A Ag NEGATIVE         Comment: NEGATIVE FOR THE PRESENCE OF INFLUENZA A ANTIGEN  INFECTION DUE TO INFLUENZA A CANNOT BE RULED OUT. BECAUSE THE ANTIGEN PRESENT IN THE SAMPLE MAY BE BELOW  THE DETECTION LIMIT OF THE TEST.   A NEGATIVE TEST IS PRESUMPTIVE AND IT IS RECOMMENDED THAT THESE RESULTS BE CONFIRMED BY VIRAL CULTURE OR AN FDA-CLEARED INFLUENZA A AND B MOLECULAR ASSAY. Influenza B Ag NEGATIVE         Comment: NEGATIVE FOR THE PRESENCE OF INFLUENZA B ANTIGEN  INFECTION DUE TO INFLUENZA B CANNOT BE RULED OUT. BECAUSE THE ANTIGEN PRESENT IN THE SAMPLE MAY BE BELOW  THE DETECTION LIMIT OF THE TEST. A NEGATIVE TEST IS PRESUMPTIVE AND IT IS RECOMMENDED THAT THESE RESULTS BE CONFIRMED BY VIRAL CULTURE OR AN FDA-CLEARED INFLUENZA A AND B MOLECULAR ASSAY.           Source NASOPHARYNGEAL             Labs: Results:       BMP, Mg, Phos Recent Labs     02/21/20  0722 02/20/20  1648    140   K 3.6 3.6   * 107   CO2 26 28   AGAP 7 5*   BUN 20 19   CREA 1.95* 2.00*   CA 8.3 8.5   * 111*      CBC Recent Labs     02/21/20  0722 02/20/20  1648   WBC 7.6 8.9   RBC 3.42* 3.40*   HGB 10.6* 10.6*   HCT 32.3* 31.7*   * 147*   GRANS 75 85*   LYMPH 11* 6*   EOS 1 1   MONOS 11 7   BASOS 0 0   IG 2 1   ANEU 5.7 7.5   ABL 0.9 0.5   YAMILA 0.1 0.1   ABM 0.8 0.6   ABB 0.0 0.0   AIG 0.1 0.1      LFT Recent Labs     02/21/20  0722 02/20/20  1648   SGOT 11* 13*   ALT 11* 11*   AP 76 80   TP 6.4 6.5   ALB 3.2 3.3   GLOB 3.2 3.2   AGRAT 1.0* 1.0*      Cardiac Testing No results found for: BNPP, BNP, CPK, RCK1, RCK2, RCK3, RCK4, CKMB, CKNDX, CKND1, TROPT, TROIQ   Coagulation Tests Lab Results   Component Value Date/Time    Prothrombin time 13.1 (H) 05/06/2015 11:36 AM    Prothrombin time 12.6 (H) 04/21/2015 11:02 AM    INR 1.2 05/06/2015 11:36 AM    INR 1.2 04/21/2015 11:02 AM      A1c No results found for: HBA1C, HGBE8, GNI9FGFJ   Lipid Panel No results found for: CHOL, CHOLPOCT, CHOLX, CHLST, CHOLV, 893016, HDL, HDLP, LDL, LDLC, DLDLP, 020041, VLDLC, VLDL, TGLX, TRIGL, TRIGP, TGLPOCT, CHHD, CHHDX   Thyroid Panel No results found for: TSH, T4, FT4, TT3, T3U, TSHEXT     Most Recent UA Lab Results   Component Value Date/Time    WBC >100 (H) 02/20/2020 05:05 PM    RBC 20-50 02/20/2020 05:05 PM    Epithelial cells 0 02/20/2020 05:05 PM    Bacteria 4+ (H) 02/20/2020 05:05 PM    Casts 5-10 02/20/2020 05:05 PM        Allergies   Allergen Reactions    Anaprox [Naproxen Sodium] Unknown (comments)    Cataflam [Diclofenac Potassium] Unknown (comments)    Feldene [Piroxicam] Unknown (comments)    Flexeril [Cyclobenzaprine] Unknown (comments)    Keflex [Cephalexin] Unknown (comments)     Can tolerate per patient    Lotensin [Benazepril] Unknown (comments)    Relafen [Nabumetone] Unknown (comments)    Sulfa (Sulfonamide Antibiotics) Unknown (comments)     Can tolerate per patient       There is no immunization history on file for this patient. All Labs from Last 24 Hrs:  No results found for this or any previous visit (from the past 24 hour(s)).     Current Med List in Hospital:   Current Facility-Administered Medications   Medication Dose Route Frequency    acetaminophen (TYLENOL) tablet 650 mg  650 mg Oral Q6H PRN    amiodarone (CORDARONE) tablet 200 mg  200 mg Oral DAILY    apixaban (ELIQUIS) tablet 2.5 mg  2.5 mg Oral BID    furosemide (LASIX) tablet 40 mg  40 mg Oral DAILY PRN    gabapentin (NEURONTIN) capsule 300 mg  300 mg Oral TID    levothyroxine (SYNTHROID) tablet 100 mcg  100 mcg Oral ACB    metoprolol succinate (TOPROL-XL) XL tablet 50 mg  50 mg Oral DAILY    pantoprazole (PROTONIX) tablet 40 mg  40 mg Oral DAILY    pravastatin (PRAVACHOL) tablet 40 mg  40 mg Oral QHS    traZODone (DESYREL) tablet 200 mg  200 mg Oral QHS    0.9% sodium chloride infusion  125 mL/hr IntraVENous CONTINUOUS    sodium chloride (NS) flush 5-40 mL  5-40 mL IntraVENous Q8H    sodium chloride (NS) flush 5-40 mL  5-40 mL IntraVENous PRN    bisacodyL (DULCOLAX) tablet 5 mg  5 mg Oral DAILY PRN    LORazepam (ATIVAN) tablet 0.5 mg  0.5 mg Oral BID PRN    oxyCODONE IR (ROXICODONE) tablet 5 mg  5 mg Oral Q4H PRN    naloxone (NARCAN) injection 0.4 mg  0.4 mg IntraVENous PRN    cefepime (MAXIPIME) 1 g in 0.9% sodium chloride (MBP/ADV) 50 mL  1 g IntraVENous Q24H    losartan/hydroCHLOROthiazide (HYZAAR) 100/12.5 mg   Oral DAILY       Discharge Exam:  Patient Vitals for the past 24 hrs:   Temp Pulse Resp BP SpO2   02/22/20 0738 98.3 °F (36.8 °C) 89 18 152/89 92 %   02/22/20 0404 98.4 °F (36.9 °C) 72 18 162/90 96 %   02/21/20 2305 98 °F (36.7 °C) 89 18 148/68 96 %   02/21/20 1912 98.2 °F (36.8 °C) 90 18 156/76 97 %   02/21/20 1526 98.1 °F (36.7 °C) 98 18 161/87 98 %   02/21/20 1046 98 °F (36.7 °C) 97 18 132/74 96 %     Oxygen Therapy  O2 Sat (%): 92 % (02/22/20 0738)  Pulse via Oximetry: 85 beats per minute (02/20/20 1947)    Estimated body mass index is 24.41 kg/m² as calculated from the following:    Height as of this encounter: 5' 9\" (1.753 m). Weight as of this encounter: 75 kg (165 lb 4.8 oz). Intake/Output Summary (Last 24 hours) at 2/22/2020 4902  Last data filed at 2/22/2020 9923  Gross per 24 hour   Intake 1525 ml   Output 850 ml   Net 675 ml       *Note that automatically entered I/Os may not be accurate; dependent on patient compliance with collection and accurate  by assistants. General:          Well nourished. Alert. CV:                  RRR. No murmur, rub, or gallop. Lungs:             CTAB. No wheezing, rhonchi, or rales. Abdomen:        Soft, nontender, nondistended. Extremities:     Warm and dry. No cyanosis or edema. Skin:                No rashes or jaundice. Neuro:             No gross focal deficits    Discharge Info:   Current Discharge Medication List      START taking these medications    Details   cefdinir (OMNICEF) 300 mg capsule Take 1 Cap by mouth two (2) times a day for 5 days. Indications: UTI  Qty: 10 Cap, Refills: 0         CONTINUE these medications which have CHANGED    Details   furosemide (LASIX) 40 mg tablet Take 1 Tab by mouth daily as needed (swelling).  Indications: accumulation of fluid resulting from chronic heart failure  Qty: 10 Tab, Refills: 0    Associated Diagnoses: Essential hypertension; Diastolic CHF, acute on chronic (HCC)         CONTINUE these medications which have NOT CHANGED    Details   apixaban (ELIQUIS) 2.5 mg tablet Take 1 Tab by mouth two (2) times a day. Qty: 180 Tab, Refills: 2      losartan-hydroCHLOROthiazide (HYZAAR) 100-12.5 mg per tablet Take 1 Tab by mouth daily. Qty: 90 Tab, Refills: 3      amiodarone (CORDARONE) 200 mg tablet Take 1 Tab by mouth daily. Qty: 90 Tab, Refills: 3      ferrous sulfate (IRON) 325 mg (65 mg iron) tablet Take  by mouth Daily (before breakfast). metoprolol succinate (TOPROL-XL) 50 mg XL tablet Take 1 Tab by mouth daily. Qty: 90 Tab, Refills: 3      acetaminophen (TYLENOL ARTHRITIS PAIN) 650 mg TbER Take 650 mg by mouth two (2) times a day. levothyroxine (SYNTHROID) 100 mcg tablet Take 1 Tab by mouth Daily (before breakfast). Qty: 90 Tab, Refills: 1      psyllium husk (METAMUCIL) 0.4 gram cap Take  by mouth. Associated Diagnoses: Paroxysmal atrial fibrillation (HCC)      CRANBERRY FRUIT CONCENTRATE (AZO CRANBERRY PO) Take  by mouth. Associated Diagnoses: Paroxysmal atrial fibrillation (HCC)      FOLIC ACID/MULTIVIT-MIN/LUTEIN (CENTRUM SILVER PO) Take  by mouth.      pantoprazole (PROTONIX) 40 mg tablet Take 40 mg by mouth daily. traZODone (DESYREL) 50 mg tablet Take 200 mg by mouth nightly. pravastatin (PRAVACHOL) 40 mg tablet Take 40 mg by mouth nightly.      gabapentin (NEURONTIN) 300 mg capsule Take 300 mg by mouth three (3) times daily. Follow Up Orders: Follow-up Appointments   Procedures    FOLLOW UP VISIT Appointment in: One Week     Standing Status:   Standing     Number of Occurrences:   1     Order Specific Question:   Appointment in     Answer:    One Week       Follow-up Information     Follow up With Specialties Details Why Contact Info    Felisha Hernandez NP Nurse Practitioner   [address] [phone]      Felisha Hernandez NP Nurse Practitioner In 1 week  [address]      Reyes Baseman, MD Urology In 1 week  [address]  [phone]            Time spent in patient discharge planning and coordination 36 minutes.     Signed:  Tierra Souza MD

## 2020-02-22 NOTE — PROGRESS NOTES
MSN, CM:  Patient to be discharged home today with [facility] [place]. Patient agrees with discharge plan. Patient has met all milestones for this admission. Care Management Interventions  PCP Verified by CM:  Yes  Transition of Care Consult (CM Consult): Home Health  Physical Therapy Consult: No  Occupational Therapy Consult: No  Current Support Network: Lives Alone  Confirm Follow Up Transport: Friends  Freedom of Choice List was Provided with Basic Dialogue that Supports the Patient's Individualized Plan of Care/Goals, Treatment Preferences and Shares the Quality Data Associated with the Providers?: Yes  [employer] Information Provided?: No  Discharge Location  Discharge Placement: Home with home health

## 2020-02-22 NOTE — PROGRESS NOTES
Patient resting quietly in bed, no s/s of discomfort. Safety precautions remain in place. Will give report to oncoming RN.

## 2020-02-22 NOTE — PROGRESS NOTES
Pt given all discharge instructions and follow up appointments. He verbalized understanding. He was also educated on the importance of sterile technique when he self caths. He is stable at current time he is on room air. He stated he has to wait for his daughter to get off of work before she can come get him.

## 2020-02-22 NOTE — PROGRESS NOTES
Patient performed self in and out cath. Cloudy, gray/yellow urine removed 250 ml. Resting quietly in bed with no c/o discomfort at this time. Call light in reach, will monitor.

## 2020-02-22 NOTE — DISCHARGE INSTRUCTIONS
Patient Education        Neurogenic Bladder: Care Instructions  Your Care Instructions    DISCHARGE SUMMARY from Nurse    PATIENT INSTRUCTIONS:    After general anesthesia or intravenous sedation, for 24 hours or while taking prescription Narcotics:  · Limit your activities  · Do not drive and operate hazardous machinery  · Do not make important personal or business decisions  · Do  not drink alcoholic beverages  · If you have not urinated within 8 hours after discharge, please contact your surgeon on call. Report the following to your surgeon:  · Excessive pain, swelling, redness or odor of or around the surgical area  · Temperature over 100.5  · Nausea and vomiting lasting longer than 4 hours or if unable to take medications  · Any signs of decreased circulation or nerve impairment to extremity: change in color, persistent  numbness, tingling, coldness or increase pain  · Any questions    What to do at Home:  Recommended activity: {discharge activity:85972}, ***    If you experience any of the following symptoms ***, please follow up with ***. *  Please give a list of your current medications to your Primary Care Provider. *  Please update this list whenever your medications are discontinued, doses are      changed, or new medications (including over-the-counter products) are added. *  Please carry medication information at all times in case of emergency situations. These are general instructions for a healthy lifestyle:    No smoking/ No tobacco products/ Avoid exposure to second hand smoke  Surgeon General's Warning:  Quitting smoking now greatly reduces serious risk to your health.     Obesity, smoking, and sedentary lifestyle greatly increases your risk for illness    A healthy diet, regular physical exercise & weight monitoring are important for maintaining a healthy lifestyle    You may be retaining fluid if you have a history of heart failure or if you experience any of the following symptoms: Weight gain of 3 pounds or more overnight or 5 pounds in a week, increased swelling in our hands or feet or shortness of breath while lying flat in bed. Please call your doctor as soon as you notice any of these symptoms; do not wait until your next office visit. The discharge information has been reviewed with the {PATIENT PARENT GUARDIAN:91145}. The {PATIENT PARENT GUARDIAN:39106} verbalized understanding. Discharge medications reviewed with the {Dishcarge meds reviewed PO:72573} and appropriate educational materials and side effects teaching were provided. ___________________________________________________________________________________________________________________________________Neurogenic bladder is nerve damage that keeps the bladder from working properly. The damage can be caused by an injury or disease. You may find it hard to go to the bathroom when you need to. Or you may leak urine between bathroom visits. Nerve damage in the brain, spinal cord, or elsewhere in the body can cause neurogenic bladder. Diseases that can lead to neurogenic bladder include Parkinson's disease, diabetes, and multiple sclerosis. The treatment for neurogenic bladder depends on the cause. Sometimes you can solve the problem by changing your diet, doing exercises to keep urine from leaking, or learning how to control your bladder. You might be able to empty your bladder using a thin flexible tube called a catheter that you insert into the bladder. However, you may need medicine, surgery, or both. Follow-up care is a key part of your treatment and safety. Be sure to make and go to all appointments, and call your doctor if you are having problems. It's also a good idea to know your test results and keep a list of the medicines you take. How can you care for yourself at home? · Take medicine as prescribed. If your doctor prescribed antibiotics, take them as directed.  Do not stop taking them just because you feel better. You need to take the full course of antibiotics. · If a certain food seems to affect your bladder, stop eating it to see if the problem goes away. · Do not smoke. It can irritate the bladder and cause bladder cancer. If you need help quitting, talk to your doctor about stop-smoking programs and medicines. These can increase your chances of quitting for good. · Try bladder training. Set certain times to go to the bathroom, and slowly increase the time between bathroom visits. This may help lengthen the time your bladder can hold urine. · Do pelvic floor (Kegel) exercises, which tighten and strengthen pelvic muscles. To do Kegel exercises:  ? Squeeze the same muscles you would use to stop your urine. Your belly and thighs should not move. ? Hold the squeeze for 3 seconds, and then relax for 3 seconds. ? Start with 3 seconds. Then add 1 second each week until you are able to squeeze for 10 seconds. ? Repeat the exercise 10 to 15 times a session. Do three or more sessions a day. · Wash your pubic area with a mild soap. Avoid deodorant soaps or soaps with heavy perfumes. · Wear loose-fitting clothing that does not put pressure on your bladder. · Wear pads in your underwear to absorb urine leakage during treatment. · Consider joining a support group. Sharing your experiences with other people who have the same problem may help you learn more and cope better. When should you call for help? Call your doctor now or seek immediate medical care if:    · You have a fever not caused by the flu or other illness.     · You have severe pain in your lower back.     · You have blood or pus in your urine.     · Your urine is cloudy or smells bad.     · You have pain or bleeding when you insert the catheter.     · You have swelling in your belly.     · You cannot urinate.    Watch closely for changes in your health, and be sure to contact your doctor if:    · You do not get better as expected.    Where can you learn more? Go to http://ned-kristin.info/. Enter B225 in the search box to learn more about \"Neurogenic Bladder: Care Instructions. \"  Current as of: December 19, 2018  Content Version: 12.2  © 7708-7839 [employer]. Care instructions adapted under license by [employer] (which disclaims liability or warranty for this information). If you have questions about a medical condition or this instruction, always ask your healthcare professional. [place] [address] any warranty or liability for your use of this information. Patient Education        Urinary Tract Infections in Men: Care Instructions  Your Care Instructions    A urinary tract infection, or UTI, is a general term for an infection anywhere between the kidneys and the tip of the penis. UTIs can also be a result of a prostate problem. Most cause pain or burning when you urinate. Most UTIs are caused by bacteria and can be cured with antibiotics. It is important to complete your treatment so that the infection does not get worse. Follow-up care is a key part of your treatment and safety. Be sure to make and go to all appointments, and call your doctor if you are having problems. It's also a good idea to know your test results and keep a list of the medicines you take. How can you care for yourself at home? · Take your antibiotics as prescribed. Do not stop taking them just because you feel better. You need to take the full course of antibiotics. · Take your medicines exactly as prescribed. Your doctor may have prescribed a medicine, such as phenazopyridine (Pyridium), to help relieve pain when you urinate. This turns your urine orange. You may stop taking it when your symptoms get better. But be sure to take all of your antibiotics, which treat the infection. · Drink extra water for the next day or two.  This will help make the urine less concentrated and help wash out the bacteria causing the infection. (If you have kidney, heart, or liver disease and have to limit your fluids, talk with your doctor before you increase your fluid intake.)  · Avoid drinks that are carbonated or have caffeine. They can irritate the bladder. · Urinate often. Try to empty your bladder each time. · To relieve pain, take a hot bath or lay a heating pad (set on low) over your lower belly or genital area. Never go to sleep with a heating pad in place. To help prevent UTIs  · Drink plenty of fluids, enough so that your urine is light yellow or clear like water. If you have kidney, heart, or liver disease and have to limit fluids, talk with your doctor before you increase the amount of fluids you drink. · Urinate when you have the urge. Do not hold your urine for a long time. Urinate before you go to sleep. · Keep your penis clean. Catheter care  If you have a drainage tube (catheter) in place, the following steps will help you care for it. · Always wash your hands before and after touching your catheter. · Check the area around the urethra for inflammation or signs of infection. Signs of infection include irritated, swollen, red, or tender skin, or pus around the catheter. · Clean the area around the catheter with soap and water two times a day. Dry with a clean towel afterward. · Do not apply powder or lotion to the skin around the catheter. To empty the urine collection bag  · Wash your hands with soap and water. · Without touching the drain spout, remove the spout from its sleeve at the bottom of the collection bag. Open the valve on the spout. · Let the urine flow out of the bag and into the toilet or a container. Do not let the tubing or drain spout touch anything. · After you empty the bag, clean the end of the drain spout with tissue and water. Close the valve and put the drain spout back into its sleeve at the bottom of the collection bag. · Wash your hands with soap and water.   When should you call for help?  Call your doctor now or seek immediate medical care if:    · Symptoms such as a fever, chills, nausea, or vomiting get worse or happen for the first time.     · You have new pain in your back just below your rib cage. This is called flank pain.     · There is new blood or pus in your urine.     · You are not able to take or keep down your antibiotics.    Watch closely for changes in your health, and be sure to contact your doctor if:    · You are not getting better after taking an antibiotic for 2 days.     · Your symptoms go away but then come back. Where can you learn more? Go to http://ned-kristin.info/. Enter D957 in the search box to learn more about \"Urinary Tract Infections in Men: Care Instructions. \"  Current as of: December 19, 2018  Content Version: 12.2  © 0983-9465 [employer]. Care instructions adapted under license by [employer] (which disclaims liability or warranty for this information). If you have questions about a medical condition or this instruction, always ask your healthcare professional. [place] [address] any warranty or liability for your use of this information.

## 2020-02-22 NOTE — PROGRESS NOTES
Patient continues resting quietly in bed with no s/s of distress. Respirations regular and unlabored. Bed alarm and safety precautions in place.

## 2020-02-23 LAB
BACTERIA SPEC CULT: ABNORMAL
BACTERIA SPEC CULT: ABNORMAL
SERVICE CMNT-IMP: ABNORMAL

## 2020-02-24 ENCOUNTER — PATIENT OUTREACH (OUTPATIENT)
Dept: CASE MANAGEMENT | Age: 85
End: 2020-02-24

## 2020-02-24 NOTE — PROGRESS NOTES
This note will not be viewable in [address] Initial [employer] outreach attempt to patient's home/mobile number was unsuccessful. Left message to return call. Will attempt second outreach within 24 hours.

## 2020-02-25 ENCOUNTER — PATIENT OUTREACH (OUTPATIENT)
Dept: CASE MANAGEMENT | Age: 85
End: 2020-02-25

## 2020-02-25 LAB
BACTERIA SPEC CULT: NORMAL
BACTERIA SPEC CULT: NORMAL
SERVICE CMNT-IMP: NORMAL
SERVICE CMNT-IMP: NORMAL

## 2020-02-25 NOTE — PROGRESS NOTES
This note will not be viewable in [address] Second [facility] outreach attempt made to patient's home/mobile number was unsuccessful. Left message to return call. Will attempt third outreach within 5 business days.

## 2020-02-26 ENCOUNTER — PATIENT OUTREACH (OUTPATIENT)
Dept: CASE MANAGEMENT | Age: 85
End: 2020-02-26

## 2020-02-26 NOTE — PROGRESS NOTES
This note will not be viewable in [address] Third [facility] outreach attempt made to home/mobile numbers. Left message to return calls. Unable to reach for [facility] program, will close case. Will reopen if call is returned.

## 2020-03-19 ENCOUNTER — HOSPITAL ENCOUNTER (OUTPATIENT)
Dept: LAB | Age: 85
Discharge: HOME | End: 2020-03-19

## 2020-03-19 LAB
ANION GAP SERPL CALC-SCNC: 10 MMOL/L (ref 7–16)
BUN SERPL-MCNC: 32 MG/DL (ref 8–23)
CALCIUM SERPL-MCNC: 8.5 MG/DL (ref 8.3–10.4)
CHLORIDE SERPL-SCNC: 105 MMOL/L (ref 98–107)
CO2 SERPL-SCNC: 24 MMOL/L (ref 21–32)
CREAT SERPL-MCNC: 2.1 MG/DL (ref 0.8–1.5)
GLUCOSE SERPL-MCNC: 116 MG/DL (ref 65–100)
POTASSIUM SERPL-SCNC: 4.3 MMOL/L (ref 3.5–5.1)
SODIUM SERPL-SCNC: 139 MMOL/L (ref 136–145)

## 2020-03-19 PROCEDURE — 80048 BASIC METABOLIC PNL TOTAL CA: CPT

## 2020-05-05 PROBLEM — I48.11 LONGSTANDING PERSISTENT ATRIAL FIBRILLATION (HCC): Status: ACTIVE | Noted: 2020-05-05

## 2022-03-18 PROBLEM — A41.9 SEPSIS SECONDARY TO UTI (HCC): Status: ACTIVE | Noted: 2020-02-20

## 2022-03-18 PROBLEM — I48.11 LONGSTANDING PERSISTENT ATRIAL FIBRILLATION (HCC): Status: ACTIVE | Noted: 2020-05-05

## 2022-03-18 PROBLEM — N39.0 SEPSIS SECONDARY TO UTI (HCC): Status: ACTIVE | Noted: 2020-02-20

## 2022-03-19 PROBLEM — N31.9 NEUROGENIC BLADDER: Status: ACTIVE | Noted: 2017-02-23

## 2022-03-19 PROBLEM — I50.30 HEART FAILURE WITH PRESERVED EJECTION FRACTION (HCC): Status: ACTIVE | Noted: 2017-11-06

## 2022-03-20 PROBLEM — Z78.9 SELF-CATHETERIZES URINARY BLADDER: Status: ACTIVE | Noted: 2020-02-21
